# Patient Record
Sex: FEMALE | Race: WHITE | NOT HISPANIC OR LATINO | Employment: OTHER | URBAN - METROPOLITAN AREA
[De-identification: names, ages, dates, MRNs, and addresses within clinical notes are randomized per-mention and may not be internally consistent; named-entity substitution may affect disease eponyms.]

---

## 2019-11-16 ENCOUNTER — OFFICE VISIT (OUTPATIENT)
Dept: URGENT CARE | Facility: CLINIC | Age: 54
End: 2019-11-16
Payer: COMMERCIAL

## 2019-11-16 VITALS
HEART RATE: 68 BPM | HEIGHT: 65 IN | SYSTOLIC BLOOD PRESSURE: 126 MMHG | DIASTOLIC BLOOD PRESSURE: 78 MMHG | WEIGHT: 185 LBS | RESPIRATION RATE: 18 BRPM | TEMPERATURE: 98.7 F | BODY MASS INDEX: 30.82 KG/M2 | OXYGEN SATURATION: 98 %

## 2019-11-16 DIAGNOSIS — R53.83 FATIGUE, UNSPECIFIED TYPE: ICD-10-CM

## 2019-11-16 DIAGNOSIS — M19.90 GENERALIZED ARTHRITIS: Primary | ICD-10-CM

## 2019-11-16 PROCEDURE — G0382 LEV 3 HOSP TYPE B ED VISIT: HCPCS | Performed by: PHYSICIAN ASSISTANT

## 2019-11-16 PROCEDURE — S9083 URGENT CARE CENTER GLOBAL: HCPCS | Performed by: PHYSICIAN ASSISTANT

## 2019-11-16 RX ORDER — LORAZEPAM 1 MG/1
1 TABLET ORAL DAILY PRN
Refills: 5 | COMMUNITY
Start: 2019-10-18 | End: 2020-07-14 | Stop reason: SDUPTHER

## 2019-11-16 RX ORDER — LEVOTHYROXINE SODIUM 0.07 MG/1
75 TABLET ORAL DAILY
Refills: 0 | COMMUNITY
Start: 2019-08-31 | End: 2020-08-14 | Stop reason: SDUPTHER

## 2019-11-16 RX ORDER — DOXYCYCLINE 100 MG/1
100 TABLET ORAL 2 TIMES DAILY
Qty: 28 TABLET | Refills: 0 | Status: SHIPPED | OUTPATIENT
Start: 2019-11-16 | End: 2019-11-30

## 2019-11-16 NOTE — PATIENT INSTRUCTIONS
Concern for Lyme disease  Prescription sent to the pharmacy for doxycycline  Follow-up with your primary care physician as soon as possible for further evaluation and recommendations  We are unable to perform Lyme titers in urgent care  May continue anti-inflammatories for pain  Proceed to  ER if symptoms worsen  Lyme Disease   AMBULATORY CARE:   Lyme disease  is a bacterial infection caused by the bite of an infected tick  Common symptoms include the following:   · A red rash that looks like a target or bull's eye    · Fever, chills, or sore throat    · Weakness and tiredness    · Headache or muscle aches    · Joint pain    · Abdominal pain, nausea, or diarrhea  Call 911 for any of the following:   · Your heart is beating faster than usual and you feel dizzy  · You have chest pain or trouble breathing  · You suddenly cannot talk or see well, or you have trouble moving an area of your body  Seek care immediately if:   · You have a headache and a stiff neck  · You have trouble concentrating or thinking clearly  · You have numbness or tingling in your arms or legs, or you have trouble walking  Contact your healthcare provider if:   · Your rash grows or spreads to other areas of your body  · You suddenly have trouble falling or staying asleep  · You have new or worsening pain and swelling in your joints  · You have new or worsening weakness and muscle pain  · You have a new tick bite  · You have questions or concerns about your condition or care  Treatment for Lyme disease  may include any of the following:  · Antibiotics  treat a bacterial infection  · NSAIDs , such as ibuprofen, help decrease swelling, pain, and fever  This medicine is available with or without a doctor's order  NSAIDs can cause stomach bleeding or kidney problems in certain people  If you take blood thinner medicine, always ask your healthcare provider if NSAIDs are safe for you   Always read the medicine label and follow directions  Follow up with your healthcare provider as directed:  Write down your questions so you remember to ask them during your visits  Prevent a tick bite:  Ticks live in areas covered by brush and grass  They may even be found in your lawn if you live in certain areas  Outdoor pets can carry ticks inside the house  Ticks can grab onto you or your clothes when you walk by grass or brush  If you go into areas that contain many trees, tall grasses, and underbrush, do the following:  · Wear light colored pants and a long-sleeved shirt  Tuck your pants into your socks or boots  Tuck in your shirt  Wear sleeves that fit close to the skin at your wrists and neck  This will help prevent ticks from crawling through gaps in your clothing and onto your skin  Wear a hat in areas with trees  · Apply insect repellant on your skin  The insect repellant should contain DEET  Do not put insect repellant on skin that is cut, scratched, or irritated  Always use soap and water to wash the insect repellant off as soon as possible once you are indoors  Do not apply insect repellant on your child's face or hands  · Spray insect repellant onto your clothes  Use permethrin spray  This spray kills ticks that crawl on your clothing  Be sure to spray the tops of your boots, bottom of pant legs, and sleeve cuffs  As soon as possible, wash and dry clothing in hot water and high heat  · Check your and your child's clothing, hair, and skin for ticks  Shower within 2 hours of coming indoors  Carefully check the hairline, armpits, neck, and waist      · Decrease the risk for ticks in your yard  Ticks like to live in shady, moist areas  [place] End[employer] your lawn regularly to keep the grass short  Trim the grass around birdbaths and fences  Cut branches that are overgrown and take them out of the yard  Clear out leaf piles  [place] Other firewood in a dry, sarwat area  · Treat pets with tick control products  as directed   This will decrease your risk for a tick bite  Check your pets for ticks  Remove ticks from pets the same way as you remove them from people  Ask your pet's  about the best product to use on your pet  · Remove a tick with tweezers  Wear gloves  Grasp the tick as close to your skin as possible  Pull the tick straight up and out  Do not touch the tick with your bare hands  Check to make sure you removed the whole tick, including the head  Clean the area with soap and water or rubbing alcohol  Then wash your hands with soap and water  For more information:   · Centers for Disease Control and Prevention ([facility])  [address] , [address]  Phone: 3- 562 - 688-4052  Web Address: Eileen arana  © 2017 [facility] Information is for End User's use only and may not be sold, redistributed or otherwise used for commercial purposes  All illustrations and images included in CareNotes® are the copyrighted property of A D A M , Inc  or [employer]  The above information is an  only  It is not intended as medical advice for individual conditions or treatments  Talk to your doctor, nurse or pharmacist before following any medical regimen to see if it is safe and effective for you

## 2019-11-16 NOTE — PROGRESS NOTES
[facility]        NAME: Akanksha Kowalski is a 49 y o  female  : 1965    MRN: [id number]  DATE: December 10, 2019  TIME: 1:54 PM    Assessment and Plan   Generalized arthritis [M19 90]  1  Generalized arthritis  doxycycline (ADOXA) 100 MG tablet   2  Fatigue, unspecified type           Patient Instructions   Concern for Lyme disease  Prescription sent to the pharmacy for doxycycline  Follow-up with your primary care physician as soon as possible for further evaluation and recommendations  We are unable to perform Lyme titers in urgent care  May continue anti-inflammatories for pain  Proceed to  ER if symptoms worsen  Chief Complaint     Chief Complaint   Patient presents with    Knee Pain     c/o of bilateral knee pain for the last 2 weeks and yesterday her bilateral elbow started and bilateral fingers - concerned about the progression of s/s         History of Present Illness   The patient is a 67-year-old female who presents with generalized symptoms that have been present for at least 2 weeks  She states that she initially developed bilateral knee pain that has since progressed to aching joints in her hands, elbows, shoulders, et [place] To  She denies any swelling or redness of her joints  Negative fever or shaking chills  Positive headache  Positive weakness  No visible rash  She denies a history of Lyme disease or recent tick removal   She states, however, that ticks are commonly found where she lives and her  has had Lyme disease  She denies any focal deficits  Negative memory changes  Negative syncope  Negative chest pain or shortness of breath  Negative abdominal pain or diarrhea  Positive nausea without vomiting  HPI    Review of Systems   Review of Systems   Constitutional: Positive for fatigue  Negative for activity change, chills, fever and unexpected weight change     HENT: Negative for congestion, ear discharge, ear pain, facial swelling, mouth sores, postnasal drip, rhinorrhea, sinus pressure, sinus pain, sneezing, sore throat and trouble swallowing  Eyes: Negative for pain, discharge, redness and itching  Respiratory: Negative for apnea, cough, chest tightness, shortness of breath, wheezing and stridor  Cardiovascular: Negative for chest pain, palpitations and leg swelling  Gastrointestinal: Positive for nausea  Negative for abdominal distention, abdominal pain, diarrhea and vomiting  Genitourinary: Negative for difficulty urinating  Musculoskeletal: Positive for arthralgias  Negative for back pain, gait problem, joint swelling, myalgias, neck pain and neck stiffness  Skin: Negative for color change, pallor, rash and wound  Allergic/Immunologic: Negative  Negative for immunocompromised state  Neurological: Negative for dizziness, tremors, seizures, syncope, facial asymmetry, speech difficulty, weakness, light-headedness, numbness and headaches  Hematological: Negative  Negative for adenopathy  Does not bruise/bleed easily  Psychiatric/Behavioral: Negative  All other systems reviewed and are negative  Current Medications       Current Outpatient Medications:     levothyroxine 75 mcg tablet, Take 75 mcg by mouth daily, Disp: , Rfl: 0    LORazepam (ATIVAN) 1 mg tablet, Take 1 mg by mouth daily as needed, Disp: , Rfl: 5    Current Allergies     Allergies as of 11/16/2019 - Reviewed 11/16/2019   Allergen Reaction Noted    Aleve [naproxen]  11/16/2019    Penicillins  11/16/2019            The following portions of the patient's history were reviewed and updated as appropriate: allergies, current medications, past family history, past medical history, past social history, past surgical history and problem list      No past medical history on file  No past surgical history on file  No family history on file  Medications have been verified          Objective   /78 (BP Location: Right arm, Patient Position: Sitting, Cuff Size: Standard)   Pulse 68   Temp 98 7 °F (37 1 °C) (Temporal)   Resp 18   Ht 5' 5" (1 651 m)   Wt 83 9 kg (185 lb)   SpO2 98%   BMI 30 79 kg/m²        Physical Exam     Physical Exam   Constitutional: She is oriented to person, place, and time  She appears well-developed and well-nourished  No distress  HENT:   Head: Normocephalic and atraumatic  Right Ear: Hearing, tympanic membrane, external ear and ear canal normal  No drainage or tenderness  Tympanic membrane is not perforated, not erythematous, not retracted and not bulging  No middle ear effusion  No decreased hearing is noted  Left Ear: Hearing, tympanic membrane, external ear and ear canal normal  No drainage or tenderness  Tympanic membrane is not perforated, not erythematous, not retracted and not bulging  No middle ear effusion  No decreased hearing is noted  Nose: Nose normal  No mucosal edema, rhinorrhea or septal deviation  Right sinus exhibits no maxillary sinus tenderness and no frontal sinus tenderness  Left sinus exhibits no maxillary sinus tenderness and no frontal sinus tenderness  Mouth/Throat: Uvula is midline, oropharynx is clear and moist and mucous membranes are normal  No oral lesions  No dental abscesses or uvula swelling  No oropharyngeal exudate, posterior oropharyngeal edema, posterior oropharyngeal erythema or tonsillar abscesses  Eyes: Pupils are equal, round, and reactive to light  Conjunctivae and EOM are normal    Neck: Trachea normal, normal range of motion and full passive range of motion without pain  Neck supple  No JVD present  No edema and no erythema present  No thyromegaly present  Cardiovascular: Normal rate, regular rhythm, S1 normal, S2 normal, normal heart sounds, intact distal pulses and normal pulses  No murmur heard  Pulmonary/Chest: Effort normal and breath sounds normal  No accessory muscle usage or stridor  No respiratory distress  She has no wheezes  Abdominal: Soft   Normal appearance and bowel sounds are normal  She exhibits no distension  There is no hepatosplenomegaly  There is no tenderness  Musculoskeletal: Normal range of motion  She exhibits no edema  Generalized arthralgias  Negative joint edema, erythema or effusion  Negative wounds or abrasions  Lymphadenopathy:        Head (right side): No submental, no submandibular, no tonsillar, no preauricular, no posterior auricular and no occipital adenopathy present  Head (left side): No submental, no submandibular, no tonsillar, no preauricular, no posterior auricular and no occipital adenopathy present  She has no cervical adenopathy  Neurological: She is alert and oriented to person, place, and time  Skin: Skin is warm, dry and intact  No abrasion, no lesion and no rash noted  She is not diaphoretic  No cyanosis or erythema  Nails show no clubbing  Psychiatric: She has a normal mood and affect  Her speech is normal and behavior is normal    Nursing note and vitals reviewed

## 2020-07-14 DIAGNOSIS — F41.9 ANXIETY: Primary | ICD-10-CM

## 2020-07-14 RX ORDER — LORAZEPAM 1 MG/1
1 TABLET ORAL DAILY
Qty: 30 TABLET | Refills: 0 | Status: SHIPPED | OUTPATIENT
Start: 2020-07-14 | End: 2020-08-14 | Stop reason: SDUPTHER

## 2020-07-14 NOTE — TELEPHONE ENCOUNTER
Patient requesting refill on lorazepam 1 mg #30 to [facility] pharmacy   OV scheduled for 8/14/2020

## 2020-08-14 ENCOUNTER — OFFICE VISIT (OUTPATIENT)
Dept: FAMILY MEDICINE CLINIC | Facility: CLINIC | Age: 55
End: 2020-08-14
Payer: COMMERCIAL

## 2020-08-14 VITALS
HEIGHT: 65 IN | SYSTOLIC BLOOD PRESSURE: 102 MMHG | OXYGEN SATURATION: 98 % | WEIGHT: 192 LBS | TEMPERATURE: 97.2 F | DIASTOLIC BLOOD PRESSURE: 62 MMHG | BODY MASS INDEX: 31.99 KG/M2 | RESPIRATION RATE: 18 BRPM | HEART RATE: 68 BPM

## 2020-08-14 DIAGNOSIS — R53.83 OTHER FATIGUE: ICD-10-CM

## 2020-08-14 DIAGNOSIS — E03.9 HYPOTHYROIDISM, UNSPECIFIED TYPE: ICD-10-CM

## 2020-08-14 DIAGNOSIS — F41.9 ANXIETY: ICD-10-CM

## 2020-08-14 DIAGNOSIS — S61.219A LACERATION OF FINGER WITHOUT COMPLICATION, INITIAL ENCOUNTER: ICD-10-CM

## 2020-08-14 DIAGNOSIS — Z13.220 SCREENING CHOLESTEROL LEVEL: ICD-10-CM

## 2020-08-14 DIAGNOSIS — L71.9 ROSACEA: Primary | ICD-10-CM

## 2020-08-14 PROCEDURE — 3008F BODY MASS INDEX DOCD: CPT | Performed by: FAMILY MEDICINE

## 2020-08-14 PROCEDURE — 1036F TOBACCO NON-USER: CPT | Performed by: FAMILY MEDICINE

## 2020-08-14 PROCEDURE — 90715 TDAP VACCINE 7 YRS/> IM: CPT | Performed by: FAMILY MEDICINE

## 2020-08-14 PROCEDURE — 90471 IMMUNIZATION ADMIN: CPT | Performed by: FAMILY MEDICINE

## 2020-08-14 PROCEDURE — 99213 OFFICE O/P EST LOW 20 MIN: CPT | Performed by: FAMILY MEDICINE

## 2020-08-14 RX ORDER — LEVOTHYROXINE SODIUM 0.07 MG/1
75 TABLET ORAL DAILY
Qty: 90 TABLET | Refills: 1 | Status: SHIPPED | OUTPATIENT
Start: 2020-08-14 | End: 2020-09-15

## 2020-08-14 RX ORDER — METRONIDAZOLE 10 MG/G
GEL TOPICAL DAILY
Qty: 45 G | Refills: 2 | Status: SHIPPED | OUTPATIENT
Start: 2020-08-14

## 2020-08-14 RX ORDER — LORAZEPAM 1 MG/1
1 TABLET ORAL DAILY
Qty: 30 TABLET | Refills: 0 | Status: SHIPPED | OUTPATIENT
Start: 2020-08-14 | End: 2020-09-15

## 2020-08-14 RX ORDER — METRONIDAZOLE 10 MG/G
GEL TOPICAL DAILY
COMMUNITY
End: 2020-08-14 | Stop reason: SDUPTHER

## 2020-08-14 NOTE — PROGRESS NOTES
BMI Counseling: Body mass index is 31 95 kg/m²  The BMI is above normal  Nutrition recommendations include decreasing portion sizes, encouraging healthy choices of fruits and vegetables, consuming healthier snacks, limiting drinks that contain sugar, moderation in carbohydrate intake, increasing intake of lean protein, reducing intake of saturated and trans fat and reducing intake of cholesterol  No pharmacotherapy was ordered  Assessment/Plan:         Problem List Items Addressed This Visit     None            Subjective:      Patient ID: Meek Roe is a 49 y o  female  Pt here for checkup on hypothyroidism, rosacea, anxiety and recently had fallen an dhurt R knee and cut self with scissors  Pt with a 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  Surface of middle  Finger  Pt with  TD shot >10 years  The following portions of the patient's history were reviewed and updated as appropriate:   She has no past medical history on file  ,  does not have a problem list on file  ,   has no past surgical history on file ,  Family history is unknown by patient  ,   reports that she has never smoked  She has never used smokeless tobacco  She reports current alcohol use  No history on file for drug ,  is allergic to aleve [naproxen] and penicillins     Current Outpatient Medications   Medication Sig Dispense Refill    levothyroxine 75 mcg tablet Take 75 mcg by mouth daily  0    LORazepam (ATIVAN) 1 mg tablet Take 1 tablet (1 mg total) by mouth daily 30 tablet 0    metroNIDAZOLE (METROGEL) 1 % gel Apply topically daily       No current facility-administered medications for this visit  Review of Systems   Constitutional: Negative for activity change, appetite change, fatigue and fever  HENT: Negative for congestion, ear pain, postnasal drip, rhinorrhea, sinus pressure, sinus pain, sneezing and sore throat  Eyes: Negative for pain and redness     Respiratory: Negative for apnea, cough, chest tightness, shortness of breath and wheezing  Cardiovascular: Negative for chest pain, palpitations and leg swelling  Gastrointestinal: Negative for abdominal pain, constipation, diarrhea, nausea and vomiting  Endocrine: Negative for cold intolerance and heat intolerance  Genitourinary: Negative for difficulty urinating, dysuria, frequency, hematuria and urgency  Musculoskeletal: Negative for arthralgias, back pain, gait problem and myalgias  Skin: Negative for rash  Neurological: Negative for dizziness, speech difficulty, weakness, numbness and headaches  Hematological: Does not bruise/bleed easily  Psychiatric/Behavioral: Negative for agitation, confusion and hallucinations  Objective:  Vitals:    08/14/20 1230   BP: 102/62   BP Location: Left arm   Patient Position: Sitting   Cuff Size: Large   Pulse: 68   Resp: 18   Temp: (!) 97 2 °F (36 2 °C)   TempSrc: Tympanic   SpO2: 98%   Weight: 87 1 kg (192 lb)   Height: 5' 5" (1 651 m)     Body mass index is 31 95 kg/m²  Physical Exam  Vitals signs and nursing note reviewed  Constitutional:       Appearance: She is well-developed  HENT:      Head: Normocephalic and atraumatic  Nose: Nose normal    Eyes:      General: No scleral icterus  Conjunctiva/sclera: Conjunctivae normal       Pupils: Pupils are equal, round, and reactive to light  Neck:      Musculoskeletal: Normal range of motion and neck supple  Thyroid: No thyromegaly  Pulmonary:      Effort: Pulmonary effort is normal       Breath sounds: Normal breath sounds  No wheezing  Abdominal:      General: Bowel sounds are normal  There is no distension  Palpations: Abdomen is soft  Tenderness: There is no abdominal tenderness  There is no guarding or rebound  Musculoskeletal: Normal range of motion  General: No tenderness or deformity  Skin:     General: Skin is warm and dry  Findings: Signs of injury present  No erythema or rash        Comments: Laceration on middle  Phalanx not  Needing  Sutures  Neurological:      Mental Status: She is alert and oriented to person, place, and time  Sensory: No sensory deficit  Psychiatric:         Behavior: Behavior normal          Thought Content:  Thought content normal          Judgment: Judgment normal

## 2020-09-13 DIAGNOSIS — E03.9 HYPOTHYROIDISM, UNSPECIFIED TYPE: ICD-10-CM

## 2020-09-13 DIAGNOSIS — F41.9 ANXIETY: ICD-10-CM

## 2020-09-15 RX ORDER — LEVOTHYROXINE SODIUM 0.07 MG/1
TABLET ORAL
Qty: 30 TABLET | Refills: 5 | Status: SHIPPED | OUTPATIENT
Start: 2020-09-15 | End: 2021-03-21

## 2020-09-15 RX ORDER — LORAZEPAM 1 MG/1
TABLET ORAL
Qty: 30 TABLET | Refills: 0 | Status: SHIPPED | OUTPATIENT
Start: 2020-09-15 | End: 2020-10-08

## 2020-10-08 DIAGNOSIS — F41.9 ANXIETY: ICD-10-CM

## 2020-10-08 RX ORDER — LORAZEPAM 1 MG/1
TABLET ORAL
Qty: 30 TABLET | Refills: 0 | Status: SHIPPED | OUTPATIENT
Start: 2020-10-08 | End: 2020-11-08

## 2020-11-07 LAB
ALBUMIN SERPL-MCNC: 4 G/DL (ref 3.6–5.1)
ALBUMIN/GLOB SERPL: 1.6 (CALC) (ref 1–2.5)
ALP SERPL-CCNC: 46 U/L (ref 37–153)
ALT SERPL-CCNC: 11 U/L (ref 6–29)
APPEARANCE UR: CLEAR
AST SERPL-CCNC: 14 U/L (ref 10–35)
BACTERIA UR QL AUTO: NORMAL /HPF
BASOPHILS # BLD AUTO: 69 CELLS/UL (ref 0–200)
BASOPHILS NFR BLD AUTO: 1.5 %
BILIRUB SERPL-MCNC: 0.9 MG/DL (ref 0.2–1.2)
BILIRUB UR QL STRIP: NEGATIVE
BUN SERPL-MCNC: 16 MG/DL (ref 7–25)
BUN/CREAT SERPL: NORMAL (CALC) (ref 6–22)
CALCIUM SERPL-MCNC: 9.3 MG/DL (ref 8.6–10.4)
CHLORIDE SERPL-SCNC: 106 MMOL/L (ref 98–110)
CHOLEST SERPL-MCNC: 155 MG/DL
CHOLEST/HDLC SERPL: 4.2 (CALC)
CO2 SERPL-SCNC: 25 MMOL/L (ref 20–32)
COLOR UR: YELLOW
CREAT SERPL-MCNC: 0.78 MG/DL (ref 0.5–1.05)
EOSINOPHIL # BLD AUTO: 340 CELLS/UL (ref 15–500)
EOSINOPHIL NFR BLD AUTO: 7.4 %
ERYTHROCYTE [DISTWIDTH] IN BLOOD BY AUTOMATED COUNT: 11.9 % (ref 11–15)
GLOBULIN SER CALC-MCNC: 2.5 G/DL (CALC) (ref 1.9–3.7)
GLUCOSE SERPL-MCNC: 93 MG/DL (ref 65–99)
GLUCOSE UR QL STRIP: NEGATIVE
HCT VFR BLD AUTO: 43.9 % (ref 35–45)
HDLC SERPL-MCNC: 37 MG/DL
HGB BLD-MCNC: 14 G/DL (ref 11.7–15.5)
HGB UR QL STRIP: NEGATIVE
HYALINE CASTS #/AREA URNS LPF: NORMAL /LPF
KETONES UR QL STRIP: NEGATIVE
LDLC SERPL CALC-MCNC: 101 MG/DL (CALC)
LEUKOCYTE ESTERASE UR QL STRIP: NEGATIVE
LYMPHOCYTES # BLD AUTO: 1559 CELLS/UL (ref 850–3900)
LYMPHOCYTES NFR BLD AUTO: 33.9 %
MAGNESIUM SERPL-MCNC: 2.1 MG/DL (ref 1.5–2.5)
MCH RBC QN AUTO: 29.9 PG (ref 27–33)
MCHC RBC AUTO-ENTMCNC: 31.9 G/DL (ref 32–36)
MCV RBC AUTO: 93.8 FL (ref 80–100)
MONOCYTES # BLD AUTO: 331 CELLS/UL (ref 200–950)
MONOCYTES NFR BLD AUTO: 7.2 %
NEUTROPHILS # BLD AUTO: 2300 CELLS/UL (ref 1500–7800)
NEUTROPHILS NFR BLD AUTO: 50 %
NITRITE UR QL STRIP: NEGATIVE
NONHDLC SERPL-MCNC: 118 MG/DL (CALC)
PH UR STRIP: 8 [PH] (ref 5–8)
PLATELET # BLD AUTO: 234 THOUSAND/UL (ref 140–400)
PMV BLD REES-ECKER: 10.1 FL (ref 7.5–12.5)
POTASSIUM SERPL-SCNC: 4.4 MMOL/L (ref 3.5–5.3)
PROT SERPL-MCNC: 6.5 G/DL (ref 6.1–8.1)
PROT UR QL STRIP: NEGATIVE
RBC # BLD AUTO: 4.68 MILLION/UL (ref 3.8–5.1)
RBC #/AREA URNS HPF: NORMAL /HPF
SL AMB EGFR AFRICAN AMERICAN: 100 ML/MIN/1.73M2
SL AMB EGFR NON AFRICAN AMERICAN: 86 ML/MIN/1.73M2
SODIUM SERPL-SCNC: 139 MMOL/L (ref 135–146)
SP GR UR STRIP: 1.02 (ref 1–1.03)
SQUAMOUS #/AREA URNS HPF: NORMAL /HPF
T4 FREE SERPL-MCNC: 1.2 NG/DL (ref 0.8–1.8)
TRIGL SERPL-MCNC: 81 MG/DL
TSH SERPL-ACNC: 1.5 MIU/L
URATE SERPL-MCNC: 6.3 MG/DL (ref 2.5–7)
WBC # BLD AUTO: 4.6 THOUSAND/UL (ref 3.8–10.8)
WBC #/AREA URNS HPF: NORMAL /HPF

## 2020-11-08 DIAGNOSIS — F41.9 ANXIETY: ICD-10-CM

## 2020-11-08 RX ORDER — LORAZEPAM 1 MG/1
TABLET ORAL
Qty: 30 TABLET | Refills: 0 | Status: SHIPPED | OUTPATIENT
Start: 2020-11-08 | End: 2020-12-14

## 2020-11-10 ENCOUNTER — TELEMEDICINE (OUTPATIENT)
Dept: FAMILY MEDICINE CLINIC | Facility: CLINIC | Age: 55
End: 2020-11-10
Payer: COMMERCIAL

## 2020-11-10 DIAGNOSIS — G89.29 CHRONIC PAIN OF BOTH KNEES: ICD-10-CM

## 2020-11-10 DIAGNOSIS — G89.29 CHRONIC PAIN OF MULTIPLE JOINTS: Primary | ICD-10-CM

## 2020-11-10 DIAGNOSIS — M25.50 CHRONIC PAIN OF MULTIPLE JOINTS: Primary | ICD-10-CM

## 2020-11-10 DIAGNOSIS — M25.561 CHRONIC PAIN OF BOTH KNEES: ICD-10-CM

## 2020-11-10 DIAGNOSIS — M25.562 CHRONIC PAIN OF BOTH KNEES: ICD-10-CM

## 2020-11-10 PROCEDURE — G2012 BRIEF CHECK IN BY MD/QHP: HCPCS | Performed by: NURSE PRACTITIONER

## 2020-11-20 LAB
B BURGDOR AB SER IA-ACNC: <0.9 INDEX
RHEUMATOID FACT SERPL-ACNC: <14 IU/ML

## 2020-11-27 ENCOUNTER — TELEPHONE (OUTPATIENT)
Dept: FAMILY MEDICINE CLINIC | Facility: CLINIC | Age: 55
End: 2020-11-27

## 2020-12-13 DIAGNOSIS — F41.9 ANXIETY: ICD-10-CM

## 2020-12-14 RX ORDER — LORAZEPAM 1 MG/1
TABLET ORAL
Qty: 30 TABLET | Refills: 0 | Status: SHIPPED | OUTPATIENT
Start: 2020-12-14 | End: 2021-01-12

## 2021-01-10 DIAGNOSIS — F41.9 ANXIETY: ICD-10-CM

## 2021-01-12 RX ORDER — LORAZEPAM 1 MG/1
TABLET ORAL
Qty: 30 TABLET | Refills: 0 | Status: SHIPPED | OUTPATIENT
Start: 2021-01-12 | End: 2021-02-05

## 2021-02-04 DIAGNOSIS — F41.9 ANXIETY: ICD-10-CM

## 2021-02-05 ENCOUNTER — OFFICE VISIT (OUTPATIENT)
Dept: FAMILY MEDICINE CLINIC | Facility: CLINIC | Age: 56
End: 2021-02-05
Payer: COMMERCIAL

## 2021-02-05 VITALS
SYSTOLIC BLOOD PRESSURE: 126 MMHG | TEMPERATURE: 98.3 F | BODY MASS INDEX: 32.82 KG/M2 | RESPIRATION RATE: 18 BRPM | OXYGEN SATURATION: 99 % | WEIGHT: 197 LBS | HEIGHT: 65 IN | DIASTOLIC BLOOD PRESSURE: 74 MMHG | HEART RATE: 70 BPM

## 2021-02-05 DIAGNOSIS — M25.50 MULTIPLE JOINT PAIN: Primary | ICD-10-CM

## 2021-02-05 DIAGNOSIS — Z12.12 SCREENING FOR COLORECTAL CANCER: ICD-10-CM

## 2021-02-05 DIAGNOSIS — Z12.31 SCREENING MAMMOGRAM FOR HIGH-RISK PATIENT: ICD-10-CM

## 2021-02-05 DIAGNOSIS — Z12.11 SCREENING FOR COLORECTAL CANCER: ICD-10-CM

## 2021-02-05 DIAGNOSIS — Z11.4 SCREENING FOR HIV (HUMAN IMMUNODEFICIENCY VIRUS): ICD-10-CM

## 2021-02-05 DIAGNOSIS — E03.9 HYPOTHYROIDISM, UNSPECIFIED TYPE: ICD-10-CM

## 2021-02-05 DIAGNOSIS — F41.9 ANXIETY: ICD-10-CM

## 2021-02-05 DIAGNOSIS — Z12.4 SCREENING FOR CERVICAL CANCER: ICD-10-CM

## 2021-02-05 PROCEDURE — 3725F SCREEN DEPRESSION PERFORMED: CPT | Performed by: FAMILY MEDICINE

## 2021-02-05 PROCEDURE — 99396 PREV VISIT EST AGE 40-64: CPT | Performed by: FAMILY MEDICINE

## 2021-02-05 RX ORDER — LORAZEPAM 1 MG/1
TABLET ORAL
Qty: 30 TABLET | Refills: 0 | Status: SHIPPED | OUTPATIENT
Start: 2021-02-05 | End: 2021-03-13

## 2021-02-05 NOTE — PROGRESS NOTES
Assessment/Plan:         Problem List Items Addressed This Visit     None      Visit Diagnoses     Screening for HIV (human immunodeficiency virus)        Screening for cervical cancer        Screening for colorectal cancer        Anxiety                Subjective:      Patient ID: Constance Wei is a 48 y o  female  Patient today for physical as well as follow-up on hypothyroidism and anxiety  Patient had a refill on her Ativan yesterday  Patient also with a new problem with multiple joint pain and will order room profile to further evaluate that problem  Patient also had been recommended to see a gynecologist for she needs screening  Patient recently moved right lower quadrant pain that have resolved  Patient is overdue for Pap smear and gyn screening  Also ordered a mammogram for her  Patient also had ultrasound of her thyroid doctoral any kind of thyroid masses or nodules  Patient also be getting lab work for her rheumatoid out rheumatological profile done  The following portions of the patient's history were reviewed and updated as appropriate:   Past Medical History:  She has a past medical history of Disease of thyroid gland  ,  _______________________________________________________________________  Medical Problems:  does not have any pertinent problems on file ,  _______________________________________________________________________  Past Surgical History:   has no past surgical history on file ,  _______________________________________________________________________  Family History:  Family history is unknown by patient  ,  _______________________________________________________________________  Social History:   reports that she has never smoked  She has never used smokeless tobacco  She reports current alcohol use  She reports that she does not use drugs  ,  _______________________________________________________________________  Allergies:  is allergic to aleve [naproxen] and penicillins     _______________________________________________________________________  Current Outpatient Medications   Medication Sig Dispense Refill    levothyroxine 75 mcg tablet TAKE ONE TABLET BY MOUTH EVERY DAY 30 tablet 5    LORazepam (ATIVAN) 1 mg tablet TAKE ONE TABLET BY MOUTH EVERY DAY 30 tablet 0    metroNIDAZOLE (METROGEL) 1 % gel Apply topically daily 45 g 2     No current facility-administered medications for this visit       _______________________________________________________________________  Review of Systems   Constitutional: Negative for activity change, appetite change, fatigue and fever  HENT: Negative for congestion, ear pain, postnasal drip, rhinorrhea, sinus pressure, sinus pain, sneezing and sore throat  Eyes: Negative for pain and redness  Respiratory: Negative for apnea, cough, chest tightness, shortness of breath and wheezing  Cardiovascular: Negative for chest pain, palpitations and leg swelling  Gastrointestinal: Negative for abdominal pain, constipation, diarrhea, nausea and vomiting  Endocrine: Negative for cold intolerance and heat intolerance  Genitourinary: Negative for difficulty urinating, dysuria, frequency, hematuria and urgency  Musculoskeletal: Negative for arthralgias, back pain, gait problem and myalgias  Skin: Negative for rash  Neurological: Negative for dizziness, speech difficulty, weakness, numbness and headaches  Hematological: Does not bruise/bleed easily  Psychiatric/Behavioral: Negative for agitation, confusion and hallucinations  Objective:  Vitals:    02/05/21 0952   BP: 126/74   BP Location: Left arm   Patient Position: Sitting   Cuff Size: Standard   Pulse: 70   Resp: 18   Temp: 98 3 °F (36 8 °C)   SpO2: 99%   Weight: 89 4 kg (197 lb)   Height: 5' 5" (1 651 m)     Body mass index is 32 78 kg/m²  Physical Exam  Vitals signs and nursing note reviewed  Constitutional:       Appearance: She is well-developed   She is obese    HENT:      Head: Normocephalic and atraumatic  Nose: Nose normal       Mouth/Throat:      Mouth: Mucous membranes are moist    Eyes:      General: No scleral icterus  Conjunctiva/sclera: Conjunctivae normal       Pupils: Pupils are equal, round, and reactive to light  Neck:      Musculoskeletal: Normal range of motion and neck supple  Thyroid: No thyromegaly  Cardiovascular:      Rate and Rhythm: Normal rate and regular rhythm  Pulmonary:      Effort: Pulmonary effort is normal       Breath sounds: Normal breath sounds  No wheezing  Abdominal:      General: Bowel sounds are normal  There is no distension  Palpations: Abdomen is soft  Tenderness: There is no abdominal tenderness  There is no guarding or rebound  Musculoskeletal: Normal range of motion  General: Tenderness present  No deformity  Skin:     General: Skin is warm and dry  Findings: No erythema or rash  Neurological:      Mental Status: She is alert and oriented to person, place, and time  Sensory: No sensory deficit  Psychiatric:         Behavior: Behavior normal          Thought Content:  Thought content normal          Judgment: Judgment normal

## 2021-02-09 ENCOUNTER — TELEPHONE (OUTPATIENT)
Dept: FAMILY MEDICINE CLINIC | Facility: CLINIC | Age: 56
End: 2021-02-09

## 2021-02-09 DIAGNOSIS — E03.9 HYPOTHYROIDISM, UNSPECIFIED TYPE: ICD-10-CM

## 2021-02-09 DIAGNOSIS — R10.31 RIGHT LOWER QUADRANT ABDOMINAL PAIN: Primary | ICD-10-CM

## 2021-02-09 NOTE — TELEPHONE ENCOUNTER
Pt called and stated that she was recently seen she was having lower abdominal pain like an ovarian cyst  She does have an appointment with GYN and she states that she thinks that problems with thyroid could contribute to this and wants to know if you can order bloodwork for this  She also is open for any other bloodwork that might help with this  Pt would like a call when ready so she can  script  She usually goes to [facility]

## 2021-02-18 LAB
ANA SER QL IF: NEGATIVE
BASOPHILS # BLD AUTO: 70 CELLS/UL (ref 0–200)
BASOPHILS NFR BLD AUTO: 1.4 %
CRP SERPL-MCNC: 2.4 MG/L
EOSINOPHIL # BLD AUTO: 250 CELLS/UL (ref 15–500)
EOSINOPHIL NFR BLD AUTO: 5 %
ERYTHROCYTE [DISTWIDTH] IN BLOOD BY AUTOMATED COUNT: 11.8 % (ref 11–15)
ERYTHROCYTE [SEDIMENTATION RATE] IN BLOOD BY WESTERGREN METHOD: 2 MM/H
HCT VFR BLD AUTO: 41.3 % (ref 35–45)
HCYS SERPL-SCNC: 13 UMOL/L
HGB BLD-MCNC: 13.4 G/DL (ref 11.7–15.5)
HIV 1+2 AB+HIV1 P24 AG SERPL QL IA: NORMAL
LYMPHOCYTES # BLD AUTO: 1635 CELLS/UL (ref 850–3900)
LYMPHOCYTES NFR BLD AUTO: 32.7 %
MCH RBC QN AUTO: 30.5 PG (ref 27–33)
MCHC RBC AUTO-ENTMCNC: 32.4 G/DL (ref 32–36)
MCV RBC AUTO: 93.9 FL (ref 80–100)
MONOCYTES # BLD AUTO: 440 CELLS/UL (ref 200–950)
MONOCYTES NFR BLD AUTO: 8.8 %
NEUTROPHILS # BLD AUTO: 2605 CELLS/UL (ref 1500–7800)
NEUTROPHILS NFR BLD AUTO: 52.1 %
PLATELET # BLD AUTO: 250 THOUSAND/UL (ref 140–400)
PMV BLD REES-ECKER: 10.3 FL (ref 7.5–12.5)
RBC # BLD AUTO: 4.4 MILLION/UL (ref 3.8–5.1)
RHEUMATOID FACT SERPL-ACNC: <14 IU/ML
TSH SERPL-ACNC: 2 MIU/L
WBC # BLD AUTO: 5 THOUSAND/UL (ref 3.8–10.8)

## 2021-02-22 ENCOUNTER — TELEPHONE (OUTPATIENT)
Dept: FAMILY MEDICINE CLINIC | Facility: CLINIC | Age: 56
End: 2021-02-22

## 2021-02-22 NOTE — TELEPHONE ENCOUNTER
Yes it  probably does  mean she has  arthritis and we can either  address at an OV or a virtual as to what can be done

## 2021-02-22 NOTE — TELEPHONE ENCOUNTER
Patient is wondering if the inflammation shown in her blood work could mean she has arthritis? She says she's been having pain in her knee and hip  Does she need an office visit? And if so can it be a telemedicine visit?

## 2021-02-26 ENCOUNTER — TELEPHONE (OUTPATIENT)
Dept: FAMILY MEDICINE CLINIC | Facility: CLINIC | Age: 56
End: 2021-02-26

## 2021-02-26 ENCOUNTER — ANNUAL EXAM (OUTPATIENT)
Dept: OBGYN CLINIC | Facility: CLINIC | Age: 56
End: 2021-02-26
Payer: COMMERCIAL

## 2021-02-26 VITALS
WEIGHT: 196 LBS | BODY MASS INDEX: 32.65 KG/M2 | HEIGHT: 65 IN | SYSTOLIC BLOOD PRESSURE: 122 MMHG | DIASTOLIC BLOOD PRESSURE: 76 MMHG

## 2021-02-26 DIAGNOSIS — Z01.419 WOMEN'S ANNUAL ROUTINE GYNECOLOGICAL EXAMINATION: Primary | ICD-10-CM

## 2021-02-26 DIAGNOSIS — N94.10 FEMALE DYSPAREUNIA: ICD-10-CM

## 2021-02-26 DIAGNOSIS — Z12.31 ENCOUNTER FOR SCREENING MAMMOGRAM FOR BREAST CANCER: ICD-10-CM

## 2021-02-26 PROCEDURE — G0145 SCR C/V CYTO,THINLAYER,RESCR: HCPCS | Performed by: NURSE PRACTITIONER

## 2021-02-26 PROCEDURE — 0503F POSTPARTUM CARE VISIT: CPT | Performed by: NURSE PRACTITIONER

## 2021-02-26 PROCEDURE — 99386 PREV VISIT NEW AGE 40-64: CPT | Performed by: NURSE PRACTITIONER

## 2021-02-26 PROCEDURE — 87624 HPV HI-RISK TYP POOLED RSLT: CPT | Performed by: NURSE PRACTITIONER

## 2021-02-26 RX ORDER — VITAMIN E 268 MG
400 CAPSULE ORAL DAILY
COMMUNITY

## 2021-02-26 RX ORDER — NICOTINE 14MG/24HR
PATCH, TRANSDERMAL 24 HOURS TRANSDERMAL
COMMUNITY

## 2021-02-26 RX ORDER — ESTRADIOL 4 UG/1
INSERT VAGINAL
Qty: 1 EACH | Refills: 0 | Status: SHIPPED | OUTPATIENT
Start: 2021-02-26 | End: 2022-04-11

## 2021-02-26 RX ORDER — ESTRADIOL 4 UG/1
INSERT VAGINAL
Qty: 1 EACH | Refills: 10 | Status: SHIPPED | OUTPATIENT
Start: 2021-02-26 | End: 2022-04-11

## 2021-02-26 RX ORDER — PROPRANOLOL/HYDROCHLOROTHIAZID 40 MG-25MG
TABLET ORAL
COMMUNITY

## 2021-02-26 NOTE — PROGRESS NOTES
Subjective    HPI:     Sukhdeep Vences is a 48 y o  postmenopausal female  She is a  0  She complains of pain with intercourse due to dryness  Occasional hot flashes, has occasional night sweats  She denies /GI and Gyn complaints  She feels safe at home  She states her depression is improving with supplement with B complex  Medical, surgical and family history reviewed  Her dental care is up-to-date  She eats a healthy diet  She is working on loosing weight  Gynecologic History    No LMP recorded  Patient is postmenopausal     Last Pap: years ago  Had one abnormal pap smear years ago and had a colpo done which was normal   Last mammogram: was with [facility] about 2 years ago  Cologuard: 20 - negative      Obstetric History    OB History    Para Term  AB Living   0 0 0 0 0 0   SAB TAB Ectopic Multiple Live Births   0 0 0 0 0       The following portions of the patient's history were reviewed and updated as appropriate: allergies, current medications, past family history, past medical history, past social history, past surgical history and problem list     Review of Systems    Pertinent items are noted in HPI  Objective    Physical Exam  Constitutional:       Appearance: She is well-developed  Genitourinary:      Pelvic exam was performed with patient in the lithotomy position  Vulva, inguinal canal, urethra, bladder, vagina, uterus, right adnexa and left adnexa normal       No posterior fourchette tenderness, injury, rash or lesion present  Cervix is not parous  No cervical motion tenderness, discharge, friability, lesion, erythema, bleeding, polyp or nabothian cyst       Uterus is anteverted  No right or left adnexal mass present  Right adnexa not tender or full  Left adnexa not tender or full  HENT:      Head: Normocephalic and atraumatic  Neck:      Musculoskeletal: Neck supple  Thyroid: No thyromegaly     Cardiovascular: Rate and Rhythm: Normal rate and regular rhythm  Heart sounds: Normal heart sounds, S1 normal and S2 normal    Pulmonary:      Effort: Pulmonary effort is normal       Breath sounds: Normal breath sounds  Chest:      Breasts: Breasts are symmetrical          Right: Normal  No inverted nipple, mass, nipple discharge, skin change or tenderness  Left: Normal  No inverted nipple, mass, nipple discharge, skin change or tenderness  Abdominal:      General: Bowel sounds are normal  There is no distension  Palpations: Abdomen is soft  There is no mass  Tenderness: There is no abdominal tenderness  There is no guarding  Lymphadenopathy:      Cervical: No cervical adenopathy  Upper Body:      Right upper body: No supraclavicular or axillary adenopathy  Left upper body: No supraclavicular or axillary adenopathy  Neurological:      Mental Status: She is alert  Skin:     General: Skin is warm and dry  Findings: No rash  Psychiatric:         Attention and Perception: Attention and perception normal          Mood and Affect: Mood and affect normal          Speech: Speech normal          Behavior: Behavior is cooperative  Thought Content: Thought content normal          Cognition and Memory: Cognition and memory normal          Judgment: Judgment normal    Vitals signs and nursing note reviewed            Assessment and Plan    Eddie Ortega was seen today for gynecologic exam     Diagnoses and all orders for this visit:    Women's annual routine gynecological examination  -     Liquid-based pap, screening    Encounter for screening mammogram for breast cancer  -     Mammo screening bilateral w 3d & cad; Future    Female dyspareunia  -     Estradiol Starter Pack (Imvexxy Starter Pack) 4 MCG INST; 1 vaginal insert daily for 2 weeks followed by 1 insert twice a week (ex: Monday and Thursday)  -     Estradiol (Imvexxy Maintenance Pack) 4 MCG INST; 1 vaginal insert twice weekly (Monday and Thursday)        Patient informed of a Stable GYN exam  A pap smear was performed  I have discussed the importance of exercise and healthy diet as well as adequate intake of calcium and vitamin D  The current ASCCP guidelines were reviewed  The low risk patient will receive pap smear screening every 3 years until the age of 34 and then every 3 to 5 years with HPV co-testing from the ages of 33-67  I emphasized the importance of an annual pelvic and breast exam  A yearly mammogram is due  All questions have been answered to her satisfaction  Hormone replacement therapy: hormone replacement therapy: initiated with Imvexxy, risks and benefits reviewed and follow up appointment recommended  Follow up in: 2 months

## 2021-02-26 NOTE — TELEPHONE ENCOUNTER
Pt reviewed her lab work and realized she os low on B-12--she has virtual appointment on 61 51 81 but wanted to know if she can come in as a nurse visit to get a B-12 shot today? ? Please review and advise Shawna nixon [address]

## 2021-03-02 LAB
HPV HR 12 DNA CVX QL NAA+PROBE: NEGATIVE
HPV16 DNA CVX QL NAA+PROBE: NEGATIVE
HPV18 DNA CVX QL NAA+PROBE: NEGATIVE

## 2021-03-03 ENCOUNTER — TELEPHONE (OUTPATIENT)
Dept: OBGYN CLINIC | Facility: CLINIC | Age: 56
End: 2021-03-03

## 2021-03-03 DIAGNOSIS — B37.3 VAGINAL CANDIDA: Primary | ICD-10-CM

## 2021-03-03 LAB
LAB AP GYN PRIMARY INTERPRETATION: NORMAL
Lab: NORMAL
PATH INTERP SPEC-IMP: NORMAL

## 2021-03-03 RX ORDER — FLUCONAZOLE 100 MG/1
100 TABLET ORAL DAILY
Qty: 2 TABLET | Refills: 0 | Status: SHIPPED | OUTPATIENT
Start: 2021-03-03 | End: 2021-03-05

## 2021-03-04 ENCOUNTER — TELEPHONE (OUTPATIENT)
Dept: ADMINISTRATIVE | Facility: OTHER | Age: 56
End: 2021-03-04

## 2021-03-04 NOTE — TELEPHONE ENCOUNTER
----- Message from Ginny Mike sent at 3/4/2021 12:05 PM EST -----  Regarding: care gap request Cologuard  03/04/21 12:05 PM    Hello, our patient attached above has had CRC: Cologuard completed/performed  Please assist in updating the patient chart by pulling a previous Electronic Medical Record (EMR) document  The previous EMR is [facility]  The date of service is 01/24/2020      Thank you,  Nisha Copeland  [facility]

## 2021-03-05 ENCOUNTER — OFFICE VISIT (OUTPATIENT)
Dept: FAMILY MEDICINE CLINIC | Facility: CLINIC | Age: 56
End: 2021-03-05
Payer: COMMERCIAL

## 2021-03-05 VITALS
BODY MASS INDEX: 32.49 KG/M2 | HEART RATE: 61 BPM | SYSTOLIC BLOOD PRESSURE: 122 MMHG | HEIGHT: 65 IN | OXYGEN SATURATION: 99 % | WEIGHT: 195 LBS | DIASTOLIC BLOOD PRESSURE: 84 MMHG | TEMPERATURE: 97.3 F

## 2021-03-05 DIAGNOSIS — D51.0 PERNICIOUS ANEMIA: ICD-10-CM

## 2021-03-05 DIAGNOSIS — M25.562 ACUTE PAIN OF LEFT KNEE: ICD-10-CM

## 2021-03-05 DIAGNOSIS — M25.552 LEFT HIP PAIN: Primary | ICD-10-CM

## 2021-03-05 PROCEDURE — 99213 OFFICE O/P EST LOW 20 MIN: CPT | Performed by: FAMILY MEDICINE

## 2021-03-05 PROCEDURE — 1036F TOBACCO NON-USER: CPT | Performed by: FAMILY MEDICINE

## 2021-03-05 PROCEDURE — 3008F BODY MASS INDEX DOCD: CPT | Performed by: FAMILY MEDICINE

## 2021-03-05 RX ORDER — CYANOCOBALAMIN 1000 UG/ML
1000 INJECTION INTRAMUSCULAR; SUBCUTANEOUS
Status: SHIPPED | OUTPATIENT
Start: 2021-03-05

## 2021-03-05 RX ADMIN — CYANOCOBALAMIN 1000 MCG: 1000 INJECTION INTRAMUSCULAR; SUBCUTANEOUS at 16:47

## 2021-03-05 NOTE — PROGRESS NOTES
BMI Counseling: Body mass index is 32 45 kg/m²  The BMI is above normal  Nutrition recommendations include decreasing portion sizes, encouraging healthy choices of fruits and vegetables, decreasing fast food intake, consuming healthier snacks, limiting drinks that contain sugar, moderation in carbohydrate intake, increasing intake of lean protein, reducing intake of saturated and trans fat and reducing intake of cholesterol  No pharmacotherapy was ordered  Patient referred to PCP due to patient being overweight  Assessment/Plan:         Problem List Items Addressed This Visit     None      Visit Diagnoses     Left hip pain    -  Primary    Relevant Orders    XR hip/pelv 2-3 vws left if performed    Acute pain of left knee        Relevant Orders    XR knee 3 vw left non injury    Pernicious anemia        Relevant Medications    cyanocobalamin injection 1,000 mcg            Subjective:      Patient ID: Giovanna Morejon is a 48 y o  female  Pt for checkup on her labs and  Also c/o L hip and knee pain  Pt  Would like  X rays and I had ordered for her  She also was inquiring about  Getting a B12 shot  To help her with her fatigue  Also talked about COVID 19 vaccine and its efficacy and my recommendations for its use  The following portions of the patient's history were reviewed and updated as appropriate:   Past Medical History:  She has a past medical history of Abnormal Pap smear of cervix, Bilateral ovarian cysts, and Disease of thyroid gland  ,  _______________________________________________________________________  Medical Problems:  does not have any pertinent problems on file ,  _______________________________________________________________________  Past Surgical History:   has no past surgical history on file ,  _______________________________________________________________________  Family History:  family history includes Cancer in her maternal grandfather; Diabetes in her father, maternal grandmother, and sister; Heart disease in her father and maternal grandmother; Hypertension in her father and mother; Other in her sister; Thyroid disease in her mother ,  _______________________________________________________________________  Social History:   reports that she has never smoked  She has never used smokeless tobacco  She reports current alcohol use  She reports that she does not use drugs  ,  _______________________________________________________________________  Allergies:  is allergic to aleve [naproxen] and penicillins     _______________________________________________________________________  Current Outpatient Medications   Medication Sig Dispense Refill    b complex vitamins tablet Take 1 tablet by mouth daily      Cholecalciferol (VITAMIN D-1000 MAX ST PO) Take by mouth      Estradiol (Imvexxy Maintenance Pack) 4 MCG INST 1 vaginal insert twice weekly (Monday and Thursday) 1 each 10    Estradiol Starter Pack (Imvexxy Starter Pack) 4 MCG INST 1 vaginal insert daily for 2 weeks followed by 1 insert twice a week (ex: Monday and Thursday) 1 each 0    fluconazole (DIFLUCAN) 100 mg tablet Take 1 tablet (100 mg total) by mouth daily for 2 days 2 tablet 0    levothyroxine 75 mcg tablet TAKE ONE TABLET BY MOUTH EVERY DAY 30 tablet 5    Loratadine (CLARITIN PO) Take by mouth      LORazepam (ATIVAN) 1 mg tablet TAKE ONE TABLET BY MOUTH EVERY DAY 30 tablet 0    metroNIDAZOLE (METROGEL) 1 % gel Apply topically daily 45 g 2    Saccharomyces boulardii (Probiotic) 250 MG CAPS Take by mouth      Turmeric 500 MG CAPS Take by mouth      vitamin E, tocopherol, 400 units capsule Take 400 Units by mouth daily       Current Facility-Administered Medications   Medication Dose Route Frequency Provider Last Rate Last Admin    cyanocobalamin injection 1,000 mcg  1,000 mcg Intramuscular Q30 Days Emmie You MD   1,000 mcg at 03/05/21 2142 _______________________________________________________________________  Review of Systems   Constitutional: Negative for activity change, appetite change, fatigue and fever  HENT: Negative for congestion, ear pain, postnasal drip, rhinorrhea, sinus pressure, sinus pain, sneezing and sore throat  Eyes: Negative for pain and redness  Respiratory: Negative for apnea, cough, chest tightness, shortness of breath and wheezing  Cardiovascular: Negative for chest pain, palpitations and leg swelling  Gastrointestinal: Negative for abdominal pain, constipation, diarrhea, nausea and vomiting  Endocrine: Negative for cold intolerance and heat intolerance  Genitourinary: Negative for difficulty urinating, dysuria, frequency, hematuria and urgency  Musculoskeletal: Positive for arthralgias  Negative for back pain, gait problem and myalgias  Skin: Negative for rash  Neurological: Negative for dizziness, speech difficulty, weakness, numbness and headaches  Hematological: Does not bruise/bleed easily  Psychiatric/Behavioral: Negative for agitation, confusion and hallucinations  Objective:  Vitals:    03/05/21 1550   BP: 122/84   BP Location: Left arm   Patient Position: Sitting   Cuff Size: Standard   Pulse: 61   Temp: (!) 97 3 °F (36 3 °C)   TempSrc: Temporal   SpO2: 99%   Weight: 88 5 kg (195 lb)   Height: 5' 5" (1 651 m)     Body mass index is 32 45 kg/m²  Physical Exam  Vitals signs and nursing note reviewed  Constitutional:       Appearance: She is well-developed  HENT:      Head: Normocephalic and atraumatic  Nose: Nose normal    Eyes:      General: No scleral icterus  Conjunctiva/sclera: Conjunctivae normal       Pupils: Pupils are equal, round, and reactive to light  Neck:      Musculoskeletal: Normal range of motion and neck supple  Thyroid: No thyromegaly  Cardiovascular:      Rate and Rhythm: Normal rate and regular rhythm     Pulmonary:      Effort: Pulmonary effort is normal       Breath sounds: Normal breath sounds  No wheezing  Abdominal:      General: Bowel sounds are normal  There is no distension  Palpations: Abdomen is soft  Tenderness: There is no abdominal tenderness  There is no guarding or rebound  Musculoskeletal: Normal range of motion  General: Tenderness present  No deformity  Skin:     General: Skin is warm and dry  Findings: No erythema or rash  Neurological:      Mental Status: She is alert and oriented to person, place, and time  Sensory: No sensory deficit  Psychiatric:         Mood and Affect: Mood normal          Behavior: Behavior normal          Thought Content:  Thought content normal          Judgment: Judgment normal

## 2021-03-05 NOTE — TELEPHONE ENCOUNTER
Upon review of the In Basket request we were able to locate, review, and update the patient chart as requested for CRC: Mattie  Any additional questions or concerns should be emailed to the Practice Liaisons via [email or web address] email, please do not reply via In Basket      Thank you  Pillo Carroll

## 2021-03-12 DIAGNOSIS — F41.9 ANXIETY: ICD-10-CM

## 2021-03-13 RX ORDER — LORAZEPAM 1 MG/1
TABLET ORAL
Qty: 30 TABLET | Refills: 0 | Status: SHIPPED | OUTPATIENT
Start: 2021-03-13 | End: 2021-04-21

## 2021-03-18 ENCOUNTER — TELEMEDICINE (OUTPATIENT)
Dept: FAMILY MEDICINE CLINIC | Facility: CLINIC | Age: 56
End: 2021-03-18
Payer: COMMERCIAL

## 2021-03-18 DIAGNOSIS — J06.9 URI WITH COUGH AND CONGESTION: Primary | ICD-10-CM

## 2021-03-18 DIAGNOSIS — E03.9 HYPOTHYROIDISM, UNSPECIFIED TYPE: ICD-10-CM

## 2021-03-18 PROCEDURE — U0005 INFEC AGEN DETEC AMPLI PROBE: HCPCS | Performed by: NURSE PRACTITIONER

## 2021-03-18 PROCEDURE — G2012 BRIEF CHECK IN BY MD/QHP: HCPCS | Performed by: NURSE PRACTITIONER

## 2021-03-18 PROCEDURE — U0003 INFECTIOUS AGENT DETECTION BY NUCLEIC ACID (DNA OR RNA); SEVERE ACUTE RESPIRATORY SYNDROME CORONAVIRUS 2 (SARS-COV-2) (CORONAVIRUS DISEASE [COVID-19]), AMPLIFIED PROBE TECHNIQUE, MAKING USE OF HIGH THROUGHPUT TECHNOLOGIES AS DESCRIBED BY CMS-2020-01-R: HCPCS | Performed by: NURSE PRACTITIONER

## 2021-03-18 NOTE — PROGRESS NOTES
COVID-19 Virtual Visit     Assessment/Plan:    Problem List Items Addressed This Visit     None      Visit Diagnoses     URI with cough and congestion    -  Primary    Relevant Orders    Novel Coronavirus (Covid-19),PCR [facility] - Collected in Office         10 minutes spent on this call     Encounter provider Michael Farris Mikey Dahl     Provider located at [address] [facility] [address] [phone]    Recent Visits  No visits were found meeting these conditions  Showing recent visits within past 7 days and meeting all other requirements     Today's Visits  Date Type Provider Dept   03/18/21 819 [facility], [address]   Showing today's visits and meeting all other requirements     Future Appointments  No visits were found meeting these conditions  Showing future appointments within next 150 days and meeting all other requirements        Patient agrees to participate in a virtual check in via telephone or video visit instead of presenting to the office to address urgent/immediate medical needs  Patient is aware this is a billable service  After connecting through Telephone, the patient was identified by name and date of birth  Eileen Scott was informed that this was a telemedicine visit and that the exam was being conducted confidentially over secure lines  Eileen Scott acknowledged consent and understanding of privacy and security of the telemedicine visit  I informed the patient that I have reviewed her record in Epic and presented the opportunity for her to ask any questions regarding the visit today  The patient agreed to participate  Subjective:   Eileen Scott is a 48 y o  female who is concerned about COVID-19  Patient's symptoms include sore throat, cough and headache       Exposure:   Contact with a person who is under investigation (PUI) for or who is positive for COVID-19 within the last 14 days?: Yes    Hospitalized recently for fever and/or lower respiratory symptoms?: No      Currently a healthcare worker that is involved in direct patient care?: No      Works in a special setting where the risk of COVID-19 transmission may be high? (this may include long-term care, correctional and alf facilities; homeless shelters; assisted-living facilities and group homes ): No      Resident in a special setting where the risk of COVID-19 transmission may be high? (this may include long-term care, correctional and alf facilities; homeless shelters; assisted-living facilities and group homes ): No      No results found for: Elijah Márquez, [address] [facility], [facility] [address]  Past Medical History:   Diagnosis Date    Abnormal Pap smear of cervix     Bilateral ovarian cysts     Disease of thyroid gland      History reviewed  No pertinent surgical history    Current Outpatient Medications   Medication Sig Dispense Refill    b complex vitamins tablet Take 1 tablet by mouth daily      Cholecalciferol (VITAMIN D-1000 MAX ST PO) Take by mouth      Estradiol (Imvexxy Maintenance Pack) 4 MCG INST 1 vaginal insert twice weekly (Monday and Thursday) 1 each 10    Estradiol Starter Pack (Imvexxy Starter Pack) 4 MCG INST 1 vaginal insert daily for 2 weeks followed by 1 insert twice a week (ex: Monday and Thursday) 1 each 0    levothyroxine 75 mcg tablet TAKE ONE TABLET BY MOUTH EVERY DAY 30 tablet 5    Loratadine (CLARITIN PO) Take by mouth      LORazepam (ATIVAN) 1 mg tablet TAKE ONE TABLET BY MOUTH EVERY DAY 30 tablet 0    metroNIDAZOLE (METROGEL) 1 % gel Apply topically daily 45 g 2    Saccharomyces boulardii (Probiotic) 250 MG CAPS Take by mouth      Turmeric 500 MG CAPS Take by mouth      vitamin E, tocopherol, 400 units capsule Take 400 Units by mouth daily       Current Facility-Administered Medications   Medication Dose Route Frequency Provider Last Rate Last Admin    cyanocobalamin injection 1,000 mcg  1,000 mcg Intramuscular Q30 Days Ilsa Preston MD   1,000 mcg at 03/05/21 1647     Allergies   Allergen Reactions    Aleve [Naproxen] Rash    Penicillins Other (See Comments)     childhood       Review of Systems   HENT: Positive for sore throat  Respiratory: Positive for cough  Neurological: Positive for headaches  Objective: There were no vitals filed for this visit  Physical Exam  Neurological:      Mental Status: She is alert and oriented to person, place, and time  Psychiatric:         Behavior: Behavior normal        VIRTUAL VISIT DISCLAIMER    Nisha Reeder acknowledges that she has consented to an online visit or consultation  She understands that the online visit is based solely on information provided by her, and that, in the absence of a face-to-face physical evaluation by the physician, the diagnosis she receives is both limited and provisional in terms of accuracy and completeness  This is not intended to replace a full medical face-to-face evaluation by the physician  Camilla Fernando understands and accepts these terms

## 2021-03-19 ENCOUNTER — DOCUMENTATION (OUTPATIENT)
Dept: FAMILY MEDICINE CLINIC | Facility: CLINIC | Age: 56
End: 2021-03-19

## 2021-03-19 LAB — SARS-COV-2 RNA RESP QL NAA+PROBE: NEGATIVE

## 2021-03-21 RX ORDER — LEVOTHYROXINE SODIUM 0.07 MG/1
TABLET ORAL
Qty: 30 TABLET | Refills: 5 | Status: SHIPPED | OUTPATIENT
Start: 2021-03-21 | End: 2021-09-27 | Stop reason: SDUPTHER

## 2021-03-26 ENCOUNTER — CLINICAL SUPPORT (OUTPATIENT)
Dept: FAMILY MEDICINE CLINIC | Facility: CLINIC | Age: 56
End: 2021-03-26

## 2021-03-26 VITALS — TEMPERATURE: 98.1 F

## 2021-03-26 DIAGNOSIS — D51.0 PERNICIOUS ANEMIA: Primary | ICD-10-CM

## 2021-04-16 ENCOUNTER — CLINICAL SUPPORT (OUTPATIENT)
Dept: FAMILY MEDICINE CLINIC | Facility: CLINIC | Age: 56
End: 2021-04-16
Payer: COMMERCIAL

## 2021-04-16 VITALS
SYSTOLIC BLOOD PRESSURE: 118 MMHG | OXYGEN SATURATION: 99 % | WEIGHT: 195 LBS | DIASTOLIC BLOOD PRESSURE: 64 MMHG | BODY MASS INDEX: 32.49 KG/M2 | TEMPERATURE: 98.2 F | HEIGHT: 65 IN

## 2021-04-16 DIAGNOSIS — D51.0 PERNICIOUS ANEMIA: Primary | ICD-10-CM

## 2021-04-16 PROCEDURE — 96372 THER/PROPH/DIAG INJ SC/IM: CPT

## 2021-04-16 PROCEDURE — 3008F BODY MASS INDEX DOCD: CPT | Performed by: FAMILY MEDICINE

## 2021-04-16 RX ADMIN — CYANOCOBALAMIN 1000 MCG: 1000 INJECTION INTRAMUSCULAR; SUBCUTANEOUS at 10:25

## 2021-04-21 DIAGNOSIS — F41.9 ANXIETY: ICD-10-CM

## 2021-04-21 RX ORDER — LORAZEPAM 1 MG/1
TABLET ORAL
Qty: 30 TABLET | Refills: 0 | Status: SHIPPED | OUTPATIENT
Start: 2021-04-21 | End: 2021-05-24 | Stop reason: SDUPTHER

## 2021-05-07 ENCOUNTER — HOSPITAL ENCOUNTER (OUTPATIENT)
Dept: RADIOLOGY | Facility: MEDICAL CENTER | Age: 56
Discharge: HOME | End: 2021-05-07
Payer: COMMERCIAL

## 2021-05-07 DIAGNOSIS — E03.9 HYPOTHYROIDISM, UNSPECIFIED TYPE: ICD-10-CM

## 2021-05-07 PROCEDURE — 76536 US EXAM OF HEAD AND NECK: CPT

## 2021-05-20 ENCOUNTER — OFFICE VISIT (OUTPATIENT)
Dept: OBGYN CLINIC | Facility: CLINIC | Age: 56
End: 2021-05-20
Payer: COMMERCIAL

## 2021-05-20 VITALS
WEIGHT: 195 LBS | SYSTOLIC BLOOD PRESSURE: 120 MMHG | DIASTOLIC BLOOD PRESSURE: 64 MMHG | BODY MASS INDEX: 32.49 KG/M2 | HEIGHT: 65 IN

## 2021-05-20 DIAGNOSIS — N94.10 FEMALE DYSPAREUNIA: Primary | ICD-10-CM

## 2021-05-20 PROCEDURE — 1036F TOBACCO NON-USER: CPT | Performed by: NURSE PRACTITIONER

## 2021-05-20 PROCEDURE — 3008F BODY MASS INDEX DOCD: CPT | Performed by: NURSE PRACTITIONER

## 2021-05-20 PROCEDURE — 99212 OFFICE O/P EST SF 10 MIN: CPT | Performed by: NURSE PRACTITIONER

## 2021-05-20 NOTE — PROGRESS NOTES
Annice Apley 61-year-old presents for follow-up after initiating HRT in February with Imvexxy for dyspareunia  Patient denies post-menopausal vaginal bleeding  The patient is not sexually active  GYN screening history: last pap: was normal  She reports improvement of vaginal dryness  She is contemplating discontinuing the HRT because she is not sexually active and she had initially started it to help with dyspareunia  Vitals:    05/20/21 1130   BP: 120/64       ROS:  As indicated in HPI  All other ROS negative  Real Reasons was seen today for follow-up  Diagnoses and all orders for this visit:    Female dyspareunia      She will message me through [employer] if she decides to continue or discontinue with HRT

## 2021-05-24 DIAGNOSIS — F41.9 ANXIETY: ICD-10-CM

## 2021-05-25 RX ORDER — LORAZEPAM 1 MG/1
1 TABLET ORAL DAILY
Qty: 30 TABLET | Refills: 0 | Status: SHIPPED | OUTPATIENT
Start: 2021-05-25 | End: 2021-06-18 | Stop reason: SDUPTHER

## 2021-06-18 DIAGNOSIS — F41.9 ANXIETY: ICD-10-CM

## 2021-06-19 RX ORDER — LORAZEPAM 1 MG/1
1 TABLET ORAL DAILY
Qty: 30 TABLET | Refills: 0 | Status: SHIPPED | OUTPATIENT
Start: 2021-06-19 | End: 2021-07-19 | Stop reason: SDUPTHER

## 2021-07-19 DIAGNOSIS — F41.9 ANXIETY: ICD-10-CM

## 2021-07-19 RX ORDER — LORAZEPAM 1 MG/1
1 TABLET ORAL DAILY
Qty: 30 TABLET | Refills: 0 | Status: SHIPPED | OUTPATIENT
Start: 2021-07-19 | End: 2021-08-23 | Stop reason: SDUPTHER

## 2021-08-19 ENCOUNTER — HOSPITAL ENCOUNTER (OUTPATIENT)
Dept: RADIOLOGY | Facility: MEDICAL CENTER | Age: 56
Discharge: HOME | End: 2021-08-19
Payer: COMMERCIAL

## 2021-08-19 VITALS — HEIGHT: 65 IN | BODY MASS INDEX: 32.49 KG/M2 | WEIGHT: 195 LBS

## 2021-08-19 DIAGNOSIS — Z12.31 ENCOUNTER FOR SCREENING MAMMOGRAM FOR BREAST CANCER: ICD-10-CM

## 2021-08-19 PROCEDURE — 77063 BREAST TOMOSYNTHESIS BI: CPT

## 2021-08-19 PROCEDURE — 77067 SCR MAMMO BI INCL CAD: CPT

## 2021-08-23 DIAGNOSIS — F41.9 ANXIETY: ICD-10-CM

## 2021-08-23 RX ORDER — LORAZEPAM 1 MG/1
1 TABLET ORAL DAILY
Qty: 30 TABLET | Refills: 0 | Status: SHIPPED | OUTPATIENT
Start: 2021-08-23 | End: 2021-09-28

## 2021-09-25 DIAGNOSIS — F41.9 ANXIETY: ICD-10-CM

## 2021-09-25 DIAGNOSIS — E03.9 HYPOTHYROIDISM, UNSPECIFIED TYPE: ICD-10-CM

## 2021-09-25 NOTE — TELEPHONE ENCOUNTER
Patient called to check on the status of this medication  She said her pharmacy made 3 attempts to call the office to have this refilled  It looks like they sent it electronically today  I did let her know that per  policy, controlled substances cannot be called outside of office hours  She is out of this medication and needs it refilled asap  Please call her when this is completed

## 2021-09-27 DIAGNOSIS — E03.9 HYPOTHYROIDISM, UNSPECIFIED TYPE: ICD-10-CM

## 2021-09-28 RX ORDER — LEVOTHYROXINE SODIUM 0.07 MG/1
75 TABLET ORAL DAILY
Qty: 30 TABLET | Refills: 5 | OUTPATIENT
Start: 2021-09-28

## 2021-09-28 RX ORDER — LEVOTHYROXINE SODIUM 0.07 MG/1
75 TABLET ORAL DAILY
Qty: 90 TABLET | Refills: 0 | Status: SHIPPED | OUTPATIENT
Start: 2021-09-28 | End: 2021-11-11 | Stop reason: SDUPTHER

## 2021-09-28 RX ORDER — LORAZEPAM 1 MG/1
TABLET ORAL
Qty: 30 TABLET | Refills: 0 | Status: SHIPPED | OUTPATIENT
Start: 2021-09-28 | End: 2021-10-26

## 2021-10-25 DIAGNOSIS — F41.9 ANXIETY: ICD-10-CM

## 2021-10-26 RX ORDER — LORAZEPAM 1 MG/1
TABLET ORAL
Qty: 30 TABLET | Refills: 0 | Status: SHIPPED | OUTPATIENT
Start: 2021-10-26 | End: 2021-11-11 | Stop reason: SDUPTHER

## 2021-11-10 PROBLEM — E66.09 CLASS 1 OBESITY DUE TO EXCESS CALORIES WITHOUT SERIOUS COMORBIDITY WITH BODY MASS INDEX (BMI) OF 32.0 TO 32.9 IN ADULT: Status: ACTIVE | Noted: 2021-11-10

## 2021-11-10 PROBLEM — E66.811 CLASS 1 OBESITY DUE TO EXCESS CALORIES WITHOUT SERIOUS COMORBIDITY WITH BODY MASS INDEX (BMI) OF 32.0 TO 32.9 IN ADULT: Status: ACTIVE | Noted: 2021-11-10

## 2021-11-11 ENCOUNTER — OFFICE VISIT (OUTPATIENT)
Dept: FAMILY MEDICINE CLINIC | Facility: CLINIC | Age: 56
End: 2021-11-11
Payer: COMMERCIAL

## 2021-11-11 VITALS
BODY MASS INDEX: 32.99 KG/M2 | HEART RATE: 66 BPM | SYSTOLIC BLOOD PRESSURE: 110 MMHG | WEIGHT: 198 LBS | RESPIRATION RATE: 18 BRPM | HEIGHT: 65 IN | OXYGEN SATURATION: 98 % | TEMPERATURE: 98 F | DIASTOLIC BLOOD PRESSURE: 72 MMHG

## 2021-11-11 DIAGNOSIS — R53.83 OTHER FATIGUE: ICD-10-CM

## 2021-11-11 DIAGNOSIS — E03.4 HYPOTHYROIDISM DUE TO ACQUIRED ATROPHY OF THYROID: Primary | ICD-10-CM

## 2021-11-11 DIAGNOSIS — E66.09 CLASS 1 OBESITY DUE TO EXCESS CALORIES WITHOUT SERIOUS COMORBIDITY WITH BODY MASS INDEX (BMI) OF 32.0 TO 32.9 IN ADULT: ICD-10-CM

## 2021-11-11 DIAGNOSIS — L71.9 ROSACEA: ICD-10-CM

## 2021-11-11 DIAGNOSIS — D51.0 PERNICIOUS ANEMIA: ICD-10-CM

## 2021-11-11 DIAGNOSIS — F41.9 ANXIETY: ICD-10-CM

## 2021-11-11 DIAGNOSIS — E03.9 HYPOTHYROIDISM, UNSPECIFIED TYPE: ICD-10-CM

## 2021-11-11 PROCEDURE — 99213 OFFICE O/P EST LOW 20 MIN: CPT | Performed by: FAMILY MEDICINE

## 2021-11-11 PROCEDURE — 96372 THER/PROPH/DIAG INJ SC/IM: CPT | Performed by: FAMILY MEDICINE

## 2021-11-11 RX ORDER — LORAZEPAM 1 MG/1
1 TABLET ORAL DAILY
Qty: 30 TABLET | Refills: 3 | Status: SHIPPED | OUTPATIENT
Start: 2021-11-11 | End: 2022-03-28

## 2021-11-11 RX ORDER — LEVOTHYROXINE SODIUM 0.07 MG/1
75 TABLET ORAL DAILY
Qty: 90 TABLET | Refills: 0 | Status: SHIPPED | OUTPATIENT
Start: 2021-11-11 | End: 2022-03-28

## 2021-11-11 RX ORDER — CYANOCOBALAMIN 1000 UG/ML
1000 INJECTION INTRAMUSCULAR; SUBCUTANEOUS
Status: SHIPPED | OUTPATIENT
Start: 2021-11-11

## 2021-11-11 RX ADMIN — CYANOCOBALAMIN 1000 MCG: 1000 INJECTION INTRAMUSCULAR; SUBCUTANEOUS at 17:31

## 2021-12-09 LAB
ALBUMIN SERPL-MCNC: 4 G/DL (ref 3.6–5.1)
ALBUMIN/GLOB SERPL: 1.5 (CALC) (ref 1–2.5)
ALP SERPL-CCNC: 48 U/L (ref 37–153)
ALT SERPL-CCNC: 16 U/L (ref 6–29)
AST SERPL-CCNC: 16 U/L (ref 10–35)
BILIRUB SERPL-MCNC: 0.8 MG/DL (ref 0.2–1.2)
BUN SERPL-MCNC: 18 MG/DL (ref 7–25)
BUN/CREAT SERPL: NORMAL (CALC) (ref 6–22)
CALCIUM SERPL-MCNC: 9.6 MG/DL (ref 8.6–10.4)
CHLORIDE SERPL-SCNC: 106 MMOL/L (ref 98–110)
CO2 SERPL-SCNC: 28 MMOL/L (ref 20–32)
CREAT SERPL-MCNC: 0.74 MG/DL (ref 0.5–1.05)
GLOBULIN SER CALC-MCNC: 2.7 G/DL (CALC) (ref 1.9–3.7)
GLUCOSE SERPL-MCNC: 88 MG/DL (ref 65–99)
POTASSIUM SERPL-SCNC: 4.3 MMOL/L (ref 3.5–5.3)
PROT SERPL-MCNC: 6.7 G/DL (ref 6.1–8.1)
SL AMB EGFR AFRICAN AMERICAN: 106 ML/MIN/1.73M2
SL AMB EGFR NON AFRICAN AMERICAN: 91 ML/MIN/1.73M2
SODIUM SERPL-SCNC: 140 MMOL/L (ref 135–146)
TSH SERPL-ACNC: 1.94 MIU/L
VIT B12 SERPL-MCNC: 741 PG/ML (ref 200–1100)

## 2022-03-26 DIAGNOSIS — F41.9 ANXIETY: ICD-10-CM

## 2022-03-26 DIAGNOSIS — E03.9 HYPOTHYROIDISM, UNSPECIFIED TYPE: ICD-10-CM

## 2022-03-28 RX ORDER — LEVOTHYROXINE SODIUM 0.07 MG/1
TABLET ORAL
Qty: 90 TABLET | Refills: 0 | Status: SHIPPED | OUTPATIENT
Start: 2022-03-28 | End: 2022-07-11

## 2022-03-28 RX ORDER — LORAZEPAM 1 MG/1
TABLET ORAL
Qty: 30 TABLET | Refills: 3 | Status: SHIPPED | OUTPATIENT
Start: 2022-03-28 | End: 2022-07-26

## 2022-04-11 ENCOUNTER — OFFICE VISIT (OUTPATIENT)
Dept: FAMILY MEDICINE CLINIC | Facility: CLINIC | Age: 57
End: 2022-04-11
Payer: COMMERCIAL

## 2022-04-11 VITALS
SYSTOLIC BLOOD PRESSURE: 124 MMHG | TEMPERATURE: 97.9 F | BODY MASS INDEX: 34.32 KG/M2 | DIASTOLIC BLOOD PRESSURE: 82 MMHG | OXYGEN SATURATION: 99 % | WEIGHT: 206 LBS | HEIGHT: 65 IN | HEART RATE: 89 BPM

## 2022-04-11 DIAGNOSIS — J01.10 ACUTE NON-RECURRENT FRONTAL SINUSITIS: Primary | ICD-10-CM

## 2022-04-11 DIAGNOSIS — J01.00 ACUTE NON-RECURRENT MAXILLARY SINUSITIS: ICD-10-CM

## 2022-04-11 PROCEDURE — 99213 OFFICE O/P EST LOW 20 MIN: CPT | Performed by: FAMILY MEDICINE

## 2022-04-11 RX ORDER — AZITHROMYCIN 250 MG/1
TABLET, FILM COATED ORAL
Qty: 6 TABLET | Refills: 0 | Status: SHIPPED | OUTPATIENT
Start: 2022-04-11 | End: 2022-04-16

## 2022-04-11 RX ORDER — FLUTICASONE PROPIONATE 50 MCG
1 SPRAY, SUSPENSION (ML) NASAL DAILY
Qty: 9.9 ML | Refills: 1 | Status: SHIPPED | OUTPATIENT
Start: 2022-04-11 | End: 2022-05-12

## 2022-04-11 NOTE — PROGRESS NOTES
BMI Counseling: Body mass index is 34 28 kg/m²  The BMI is above normal  Nutrition recommendations include decreasing portion sizes, encouraging healthy choices of fruits and vegetables, decreasing fast food intake, consuming healthier snacks, limiting drinks that contain sugar, moderation in carbohydrate intake, increasing intake of lean protein, reducing intake of saturated and trans fat and reducing intake of cholesterol  Exercise recommendations include exercising 3-5 times per week  No pharmacotherapy was ordered  Patient referred to PCP  Rationale for BMI follow-up plan is due to patient being overweight or obese  Assessment/Plan:         Problem List Items Addressed This Visit     None      Visit Diagnoses     Acute non-recurrent frontal sinusitis    -  Primary    Relevant Medications    azithromycin (Zithromax) 250 mg tablet    Acute non-recurrent maxillary sinusitis                Subjective:      Patient ID: Shaji Avila is a 55 y o  female  Is a 60-year-old female here today for checkup for acute problem  Patient with nasal congestion some ear pressure and ear pain recently was sick with some low-grade fever although she is afebrile now some headache behind her ears  Postnasal drip some sore throat which is now got better  Patient with some mild cough but is nonproductive and has no shortness of breath or wheezing and no nausea vomiting or diarrhea although she does have a decreased appetite  Patient has been self trip medicating with Sudafed and has been this is been helpful for her  Patient also done 3 COVID test home which were negative      The following portions of the patient's history were reviewed and updated as appropriate:   Past Medical History:  She has a past medical history of Abnormal Pap smear of cervix, Bilateral ovarian cysts, and Disease of thyroid gland  ,  _______________________________________________________________________  Medical Problems:  does not have any pertinent problems on file ,  _______________________________________________________________________  Past Surgical History:   has no past surgical history on file ,  _______________________________________________________________________  Family History:  family history includes Breast cancer in her other; Cancer in her maternal grandfather; Diabetes in her father, maternal grandmother, and sister; Heart disease in her father and maternal grandmother; Hypertension in her father and mother; No Known Problems in her paternal grandfather, paternal grandmother, and paternal uncle; Other in her sister; Thyroid disease in her mother ,  _______________________________________________________________________  Social History:   reports that she has never smoked  She has never used smokeless tobacco  She reports current alcohol use  She reports that she does not use drugs  ,  _______________________________________________________________________  Allergies:  is allergic to aleve [naproxen] and penicillins     _______________________________________________________________________  Current Outpatient Medications   Medication Sig Dispense Refill    b complex vitamins tablet Take 1 tablet by mouth daily      Cholecalciferol (VITAMIN D-1000 MAX ST PO) Take by mouth      Glucos-Chondroit-Hyaluron-MSM (GLUCOSAMINE CHONDROITIN JOINT PO) Take by mouth      levothyroxine 75 mcg tablet TAKE ONE TABLET BY MOUTH EVERY DAY 90 tablet 0    Loratadine (CLARITIN PO) Take by mouth      LORazepam (ATIVAN) 1 mg tablet TAKE ONE TABLET BY MOUTH EVERY DAY 30 tablet 3    metroNIDAZOLE (METROGEL) 1 % gel Apply topically daily 45 g 2    Saccharomyces boulardii (Probiotic) 250 MG CAPS Take by mouth      Turmeric 500 MG CAPS Take by mouth      vitamin E, tocopherol, 400 units capsule Take 400 Units by mouth daily      azithromycin (Zithromax) 250 mg tablet Take 2 tablets (500 mg total) by mouth daily for 1 day, THEN 1 tablet (250 mg total) daily for 4 days  6 tablet 0     Current Facility-Administered Medications   Medication Dose Route Frequency Provider Last Rate Last Admin    cyanocobalamin injection 1,000 mcg  1,000 mcg Intramuscular Q30 Days Deyanira Castanon MD   1,000 mcg at 04/16/21 1025    cyanocobalamin injection 1,000 mcg  1,000 mcg Intramuscular Q30 Days Deyanira Castanon MD   1,000 mcg at 11/11/21 1731     _______________________________________________________________________  Review of Systems   Constitutional: Positive for chills and fever  Negative for activity change, appetite change and fatigue  HENT: Positive for ear pain, postnasal drip, sinus pressure, sinus pain, sneezing and sore throat  Negative for congestion and rhinorrhea  Eyes: Negative for pain and redness  Respiratory: Negative for apnea, cough, chest tightness, shortness of breath and wheezing  Cardiovascular: Negative for chest pain, palpitations and leg swelling  Gastrointestinal: Negative for abdominal pain, constipation, diarrhea, nausea and vomiting  Endocrine: Negative for cold intolerance and heat intolerance  Genitourinary: Negative for difficulty urinating, dysuria, frequency, hematuria and urgency  Musculoskeletal: Negative for arthralgias, back pain, gait problem and myalgias  Skin: Negative for rash  Neurological: Negative for dizziness, speech difficulty, weakness, numbness and headaches  Hematological: Does not bruise/bleed easily  Psychiatric/Behavioral: Negative for agitation, confusion and hallucinations  Objective:  Vitals:    04/11/22 1359   BP: 124/82   BP Location: Left arm   Patient Position: Sitting   Cuff Size: Standard   Pulse: 89   Temp: 97 9 °F (36 6 °C)   TempSrc: Skin   SpO2: 99%   Weight: 93 4 kg (206 lb)   Height: 5' 5" (1 651 m)     Body mass index is 34 28 kg/m²  Physical Exam  Vitals and nursing note reviewed  Constitutional:       Appearance: She is well-developed  She is ill-appearing     HENT: Head: Normocephalic and atraumatic  Right Ear: Tympanic membrane, ear canal and external ear normal  There is no impacted cerumen  Left Ear: Tympanic membrane, ear canal and external ear normal  There is no impacted cerumen  Nose: Congestion and rhinorrhea present  Mouth/Throat:      Mouth: Mucous membranes are moist       Comments: Green post nasal drip  Eyes:      General: No scleral icterus  Conjunctiva/sclera: Conjunctivae normal       Pupils: Pupils are equal, round, and reactive to light  Neck:      Thyroid: No thyromegaly  Cardiovascular:      Rate and Rhythm: Normal rate and regular rhythm  Pulmonary:      Effort: Pulmonary effort is normal       Breath sounds: Normal breath sounds  No wheezing  Abdominal:      General: Bowel sounds are normal  There is no distension  Palpations: Abdomen is soft  Tenderness: There is no abdominal tenderness  There is no guarding or rebound  Musculoskeletal:         General: No tenderness or deformity  Normal range of motion  Cervical back: Normal range of motion and neck supple  Skin:     General: Skin is warm and dry  Findings: No erythema or rash  Neurological:      Mental Status: She is alert and oriented to person, place, and time  Sensory: No sensory deficit  Psychiatric:         Mood and Affect: Mood normal          Behavior: Behavior normal          Thought Content:  Thought content normal          Judgment: Judgment normal

## 2022-04-21 ENCOUNTER — TELEPHONE (OUTPATIENT)
Dept: FAMILY MEDICINE CLINIC | Facility: CLINIC | Age: 57
End: 2022-04-21

## 2022-04-21 DIAGNOSIS — J01.10 ACUTE NON-RECURRENT FRONTAL SINUSITIS: Primary | ICD-10-CM

## 2022-04-21 RX ORDER — DOXYCYCLINE HYCLATE 100 MG/1
100 CAPSULE ORAL EVERY 12 HOURS SCHEDULED
Qty: 20 CAPSULE | Refills: 0 | Status: SHIPPED | OUTPATIENT
Start: 2022-04-21 | End: 2022-05-01

## 2022-04-21 NOTE — TELEPHONE ENCOUNTER
Chanten call in doxycycline for 10 days  She needs to take medicine OTC for symptoms like mucinex and flonase nasal spray  If not better or worse, she needs a recheck in the office

## 2022-04-21 NOTE — TELEPHONE ENCOUNTER
Patient called stating the antibiotic prescribed did not help her and is asking if something else can be sent to her pharmacy

## 2022-05-11 PROBLEM — F41.9 ANXIETY: Status: ACTIVE | Noted: 2022-05-11

## 2022-05-11 NOTE — PROGRESS NOTES
Assessment/Plan:         Problem List Items Addressed This Visit        Endocrine    Hypothyroidism - Primary     Patient to continue current dose of thyroid medicine and recheck TSH in 6 months           Relevant Orders    TSH + Free T4    US thyroid       Other    Class 1 obesity due to excess calories without serious comorbidity with body mass index (BMI) of 32 0 to 32 9 in adult     Patient to increase exercise and partake of a diet with less calories to promote  weight loss           Anxiety     Patient to continue utilizing medical therapy as well as counseling sources as applicable to condition  If suicidal thought or fear of suicide attempt, to call 911 and seek help immediately  Medications and therapy reviewed today and all patient  questions answered today  Other Visit Diagnoses     Well adult exam        Other fatigue        Relevant Orders    Comprehensive metabolic panel    CBC and differential    Magnesium    Uric acid    Urinalysis with microscopic    Need for hepatitis C screening test        Relevant Orders    Hepatitis C antibody    Screening cholesterol level        Relevant Orders    Lipid Panel with Direct LDL reflex    Vitamin B12 deficiency        Relevant Orders    Vitamin B12    Screening mammogram for high-risk patient        Relevant Orders    Mammo screening bilateral w 3d & cad            Subjective:      Patient ID: Jamaal Munson is a 55 y o  female  This is a 56-year-old female here today for her yearly physical exam   We discussed with her her use of medications and her problems of hypothyroidism and some anxiety  PE also discussed with her about screening for her mammogram as well as the lab work she is due for this year  Patient was requesting a B12 level because she takes vitamin-B complex and I will add that to her regimen  Patient not sure if she needs refills and will call when she needs them will review refill as appropriate    Patient also is due for her ultrasound of her thyroid we did that yearly  Patient's hypothyroidism were assessing thyroid structure will integrity  Patient also had Cologuard 2 years ago on reminder that she is due for that next year  The following portions of the patient's history were reviewed and updated as appropriate:   Past Medical History:  She has a past medical history of Abnormal Pap smear of cervix, Bilateral ovarian cysts, and Disease of thyroid gland  ,  _______________________________________________________________________  Medical Problems:  does not have any pertinent problems on file ,  _______________________________________________________________________  Past Surgical History:   has no past surgical history on file ,  _______________________________________________________________________  Family History:  family history includes Breast cancer in her other; Cancer in her maternal grandfather; Diabetes in her father, maternal grandmother, and sister; Heart disease in her father and maternal grandmother; Hypertension in her father and mother; No Known Problems in her paternal grandfather, paternal grandmother, and paternal uncle; Other in her sister; Thyroid disease in her mother ,  _______________________________________________________________________  Social History:   reports that she has never smoked  She has never used smokeless tobacco  She reports current alcohol use  She reports that she does not use drugs  ,  _______________________________________________________________________  Allergies:  is allergic to aleve [naproxen] and penicillins     _______________________________________________________________________  Current Outpatient Medications   Medication Sig Dispense Refill    b complex vitamins tablet Take 1 tablet by mouth daily      Cholecalciferol (VITAMIN D-1000 MAX ST PO) Take by mouth      Glucos-Chondroit-Hyaluron-MSM (GLUCOSAMINE CHONDROITIN JOINT PO) Take by mouth      levothyroxine 75 mcg tablet TAKE ONE TABLET BY MOUTH EVERY DAY 90 tablet 0    Loratadine (CLARITIN PO) Take by mouth      LORazepam (ATIVAN) 1 mg tablet TAKE ONE TABLET BY MOUTH EVERY DAY 30 tablet 3    metroNIDAZOLE (METROGEL) 1 % gel Apply topically daily 45 g 2    Saccharomyces boulardii (Probiotic) 250 MG CAPS Take by mouth      Turmeric 500 MG CAPS Take by mouth      vitamin E, tocopherol, 400 units capsule Take 400 Units by mouth daily       Current Facility-Administered Medications   Medication Dose Route Frequency Provider Last Rate Last Admin    cyanocobalamin injection 1,000 mcg  1,000 mcg Intramuscular Q30 Days Gabriel Clement MD   1,000 mcg at 04/16/21 1025    cyanocobalamin injection 1,000 mcg  1,000 mcg Intramuscular Q30 Days Gabriel Clement MD   1,000 mcg at 11/11/21 1731     _______________________________________________________________________  Review of Systems   Constitutional: Negative for activity change, appetite change, fatigue and fever  HENT: Negative for congestion, ear pain, postnasal drip, rhinorrhea, sinus pressure, sinus pain, sneezing and sore throat  Eyes: Negative for pain and redness  Respiratory: Negative for apnea, cough, chest tightness, shortness of breath and wheezing  Cardiovascular: Negative for chest pain, palpitations and leg swelling  Gastrointestinal: Negative for abdominal pain, constipation, diarrhea, nausea and vomiting  Endocrine: Negative for cold intolerance and heat intolerance  Genitourinary: Negative for difficulty urinating, dysuria, frequency, hematuria and urgency  Musculoskeletal: Negative for arthralgias, back pain, gait problem and myalgias  Skin: Negative for rash  Neurological: Negative for dizziness, speech difficulty, weakness, numbness and headaches  Hematological: Does not bruise/bleed easily  Psychiatric/Behavioral: Negative for agitation, confusion and hallucinations           Objective:  Vitals:    05/12/22 1308   BP: 112/80   BP Location: Left arm   Patient Position: Sitting   Cuff Size: Large   Pulse: 70   Temp: (!) 97 3 °F (36 3 °C)   TempSrc: Skin   SpO2: 99%   Weight: 93 4 kg (206 lb)   Height: 5' 3" (1 6 m)     Body mass index is 36 49 kg/m²  Physical Exam  Vitals and nursing note reviewed  Constitutional:       Appearance: She is well-developed  HENT:      Head: Normocephalic and atraumatic  Right Ear: Tympanic membrane, ear canal and external ear normal       Left Ear: Tympanic membrane, ear canal and external ear normal       Nose: Nose normal       Mouth/Throat:      Mouth: Mucous membranes are moist    Eyes:      General: No scleral icterus  Conjunctiva/sclera: Conjunctivae normal       Pupils: Pupils are equal, round, and reactive to light  Neck:      Thyroid: No thyromegaly  Cardiovascular:      Rate and Rhythm: Normal rate and regular rhythm  Pulmonary:      Effort: Pulmonary effort is normal       Breath sounds: Normal breath sounds  No wheezing  Abdominal:      General: Bowel sounds are normal  There is no distension  Palpations: Abdomen is soft  Tenderness: There is no abdominal tenderness  There is no guarding or rebound  Musculoskeletal:         General: No tenderness or deformity  Normal range of motion  Cervical back: Normal range of motion and neck supple  Skin:     General: Skin is warm and dry  Findings: No erythema or rash  Neurological:      Mental Status: She is alert and oriented to person, place, and time  Sensory: No sensory deficit  Psychiatric:         Mood and Affect: Mood normal          Behavior: Behavior normal          Thought Content:  Thought content normal          Judgment: Judgment normal

## 2022-05-12 ENCOUNTER — OFFICE VISIT (OUTPATIENT)
Dept: FAMILY MEDICINE CLINIC | Facility: CLINIC | Age: 57
End: 2022-05-12
Payer: COMMERCIAL

## 2022-05-12 VITALS
WEIGHT: 206 LBS | HEIGHT: 63 IN | DIASTOLIC BLOOD PRESSURE: 80 MMHG | SYSTOLIC BLOOD PRESSURE: 112 MMHG | OXYGEN SATURATION: 99 % | BODY MASS INDEX: 36.5 KG/M2 | TEMPERATURE: 97.3 F | HEART RATE: 70 BPM

## 2022-05-12 DIAGNOSIS — F41.9 ANXIETY: ICD-10-CM

## 2022-05-12 DIAGNOSIS — Z13.220 SCREENING CHOLESTEROL LEVEL: ICD-10-CM

## 2022-05-12 DIAGNOSIS — Z11.59 NEED FOR HEPATITIS C SCREENING TEST: ICD-10-CM

## 2022-05-12 DIAGNOSIS — Z00.00 WELL ADULT EXAM: ICD-10-CM

## 2022-05-12 DIAGNOSIS — E53.8 VITAMIN B12 DEFICIENCY: ICD-10-CM

## 2022-05-12 DIAGNOSIS — R53.83 OTHER FATIGUE: ICD-10-CM

## 2022-05-12 DIAGNOSIS — E66.09 CLASS 1 OBESITY DUE TO EXCESS CALORIES WITHOUT SERIOUS COMORBIDITY WITH BODY MASS INDEX (BMI) OF 32.0 TO 32.9 IN ADULT: ICD-10-CM

## 2022-05-12 DIAGNOSIS — E03.4 HYPOTHYROIDISM DUE TO ACQUIRED ATROPHY OF THYROID: Primary | ICD-10-CM

## 2022-05-12 DIAGNOSIS — Z12.31 SCREENING MAMMOGRAM FOR HIGH-RISK PATIENT: ICD-10-CM

## 2022-05-12 PROCEDURE — 99396 PREV VISIT EST AGE 40-64: CPT | Performed by: FAMILY MEDICINE

## 2022-05-30 ENCOUNTER — NURSE TRIAGE (OUTPATIENT)
Dept: OTHER | Facility: OTHER | Age: 57
End: 2022-05-30

## 2022-05-30 DIAGNOSIS — L25.5 DERMATITIS DUE TO PLANTS, INCLUDING POISON IVY, SUMAC, AND OAK: ICD-10-CM

## 2022-05-30 DIAGNOSIS — L23.7 POISON IVY: Primary | ICD-10-CM

## 2022-05-30 DIAGNOSIS — L29.9 PRURITUS: ICD-10-CM

## 2022-05-30 NOTE — TELEPHONE ENCOUNTER
Reason for Disposition   SEVERE itching (e g , interferes with sleep or normal activities)    Answer Assessment - Initial Assessment Questions  1  APPEARANCE of RASH: "Describe the rash "       Red,raised,itchiness, not to blistery due to using alcohol on it to dry it out  2  LOCATION: "Where is the rash located?"       Ankles and forearm  3  SIZE: "How large is the rash?"       Fairly large  4  ONSET: "When did the rash begin?"       Saturday after weeding all day  5  ITCHING: "Does the rash itch?" If Yes, ask: "How bad is it?"    - MILD - doesn't interfere with normal activities    - MODERATE-SEVERE: interferes with work, school, sleep, or other activities       Moderate- only use Alcohol to dry it out  Anti itch creams  6   PREGNANCY: "Is there any chance you are pregnant?" "When was your last menstrual period?"      N/A    Protocols used: POISON IVY - [place] - SUM-ADULT-

## 2022-05-30 NOTE — TELEPHONE ENCOUNTER
Called patient to follow up on page from on call nursing  Currently the patient is having mild to moderate symptoms on her ankles and forearms  Does not seem to be extensive and patient has been drying area with rubbing alcohol per nurse triage  The patient requested steroids  After reviewing symptoms and extent of the rash will consider potent topical steroid to help calm rash and prevent further spread

## 2022-05-30 NOTE — TELEPHONE ENCOUNTER
Regarding: Poison Ivy  ----- Message from Leti Briggs RN sent at 5/30/2022 10:29 AM EDT -----  "I have poison ivy and I would like something ordered from the on call  "

## 2022-05-31 ENCOUNTER — TELEPHONE (OUTPATIENT)
Dept: FAMILY MEDICINE CLINIC | Facility: CLINIC | Age: 57
End: 2022-05-31

## 2022-05-31 DIAGNOSIS — L23.7 POISON IVY DERMATITIS: Primary | ICD-10-CM

## 2022-05-31 RX ORDER — METHYLPREDNISOLONE 4 MG/1
TABLET ORAL
Qty: 21 EACH | Refills: 0 | Status: SHIPPED | OUTPATIENT
Start: 2022-05-31

## 2022-05-31 NOTE — TELEPHONE ENCOUNTER
Pt called and stated that she has poison on her arms and ankles and was given a cream with the on call and is looking to see if you can call in a script for poison ivy for her

## 2022-06-12 ENCOUNTER — OFFICE VISIT (OUTPATIENT)
Dept: URGENT CARE | Facility: CLINIC | Age: 57
End: 2022-06-12
Payer: COMMERCIAL

## 2022-06-12 VITALS
TEMPERATURE: 97.2 F | HEIGHT: 65 IN | SYSTOLIC BLOOD PRESSURE: 139 MMHG | DIASTOLIC BLOOD PRESSURE: 67 MMHG | RESPIRATION RATE: 18 BRPM | HEART RATE: 77 BPM | BODY MASS INDEX: 34.32 KG/M2 | OXYGEN SATURATION: 98 % | WEIGHT: 206 LBS

## 2022-06-12 DIAGNOSIS — R35.0 URINE FREQUENCY: Primary | ICD-10-CM

## 2022-06-12 LAB
SL AMB  POCT GLUCOSE, UA: NEGATIVE
SL AMB LEUKOCYTE ESTERASE,UA: NEGATIVE
SL AMB POCT BILIRUBIN,UA: NEGATIVE
SL AMB POCT BLOOD,UA: NEGATIVE
SL AMB POCT CLARITY,UA: CLEAR
SL AMB POCT COLOR,UA: NORMAL
SL AMB POCT KETONES,UA: NEGATIVE
SL AMB POCT NITRITE,UA: NEGATIVE
SL AMB POCT PH,UA: 8.5
SL AMB POCT SPECIFIC GRAVITY,UA: 1
SL AMB POCT URINE PROTEIN: NEGATIVE
SL AMB POCT UROBILINOGEN: 0.2

## 2022-06-12 PROCEDURE — 81002 URINALYSIS NONAUTO W/O SCOPE: CPT | Performed by: NURSE PRACTITIONER

## 2022-06-12 PROCEDURE — 99213 OFFICE O/P EST LOW 20 MIN: CPT | Performed by: NURSE PRACTITIONER

## 2022-06-12 NOTE — PROGRESS NOTES
[facility]        NAME: Nasra Garcia is a 55 y o  female  : 1965    MRN: [id number]  DATE: 2022  TIME: 3:41 PM    Assessment and Plan   Urine frequency [R35 0]  1  Urine frequency  POCT urine dip         Patient Instructions       Follow up with PCP in 3-5 days  Proceed to  ER if symptoms worsen  Patient Instructions   Urine normal in office today  Discontinue herbal supplementation as this may have skewed urine results in office today  Hydrate adequately  Advised to follow up with PCP in 3-5 days for any dysuria, persistent blood in urine or contact gynecology for annual follow up to rule out vaginal discharge as cause of bleeding  Chief Complaint     Chief Complaint   Patient presents with    Possible UTI     Blood in urine, pressure in abd and urine frequency started 10 days  History of Present Illness       Symptoms spotting noticed 2-3 days ago from vaginal area with some low abdominal discomfort  Menopausal   Taking daily "urinary health" supplement bought on  E [facility] [address] x 1 week  Patient states positive blood in urine yesterday and with wiping  No dysuria  Review of Systems   Review of Systems   Constitutional: Negative for fever  HENT: Negative for congestion, ear pain, sneezing and sore throat  Eyes: Negative for discharge and redness  Respiratory: Negative for cough, chest tightness and shortness of breath  Cardiovascular: Negative for chest pain  Gastrointestinal: Positive for abdominal pain (low mid abdominal/adnexal )  Negative for diarrhea, nausea and vomiting  Genitourinary: Positive for frequency (mild increase noticed recently) and hematuria  Negative for decreased urine volume and dysuria  Musculoskeletal: Negative for myalgias  Allergic/Immunologic: Negative for environmental allergies  Neurological: Negative for dizziness, syncope and headaches  Hematological: Negative for adenopathy     Psychiatric/Behavioral: Negative for sleep disturbance           Current Medications       Current Outpatient Medications:     b complex vitamins tablet, Take 1 tablet by mouth daily, Disp: , Rfl:     betamethasone valerate (VALISONE) 0 1 % cream, Apply topically 2 (two) times a day, Disp: 45 g, Rfl: 0    Cholecalciferol (VITAMIN D-1000 MAX ST PO), Take by mouth, Disp: , Rfl:     Glucos-Chondroit-Hyaluron-MSM (GLUCOSAMINE CHONDROITIN JOINT PO), Take by mouth, Disp: , Rfl:     levothyroxine 75 mcg tablet, TAKE ONE TABLET BY MOUTH EVERY DAY, Disp: 90 tablet, Rfl: 0    Loratadine (CLARITIN PO), Take by mouth, Disp: , Rfl:     LORazepam (ATIVAN) 1 mg tablet, TAKE ONE TABLET BY MOUTH EVERY DAY, Disp: 30 tablet, Rfl: 3    metroNIDAZOLE (METROGEL) 1 % gel, Apply topically daily, Disp: 45 g, Rfl: 2    Saccharomyces boulardii (Probiotic) 250 MG CAPS, Take by mouth, Disp: , Rfl:     Turmeric 500 MG CAPS, Take by mouth, Disp: , Rfl:     vitamin E, tocopherol, 400 units capsule, Take 400 Units by mouth daily, Disp: , Rfl:     methylPREDNISolone 4 MG tablet therapy pack, Use as directed on package (Patient not taking: Reported on 6/12/2022), Disp: 21 each, Rfl: 0    Current Facility-Administered Medications:     cyanocobalamin injection 1,000 mcg, 1,000 mcg, Intramuscular, Q30 Days, Gabriel Clement MD, 1,000 mcg at 04/16/21 1025    cyanocobalamin injection 1,000 mcg, 1,000 mcg, Intramuscular, Q30 Days, Gabriel Clement MD, 1,000 mcg at 11/11/21 1731    Current Allergies     Allergies as of 06/12/2022 - Reviewed 06/12/2022   Allergen Reaction Noted    Aleve [naproxen] Rash 11/16/2019    Penicillins Other (See Comments) 11/16/2019            The following portions of the patient's history were reviewed and updated as appropriate: allergies, current medications, past family history, past medical history, past social history, past surgical history and problem list      Past Medical History:   Diagnosis Date    Abnormal Pap smear of cervix  Bilateral ovarian cysts     Disease of thyroid gland        History reviewed  No pertinent surgical history  Family History   Problem Relation Age of Onset    Thyroid disease Mother     Hypertension Mother     Heart disease Father     Diabetes Father     Hypertension Father     Diabetes Sister     Other Sister         car accident    Heart disease Maternal Grandmother     Diabetes Maternal Grandmother     Cancer Maternal Grandfather     No Known Problems Paternal Grandmother     No Known Problems Paternal Grandfather     Breast cancer Other     No Known Problems Paternal Uncle          Medications have been verified  Objective   /67   Pulse 77   Temp (!) 97 2 °F (36 2 °C) (Temporal)   Resp 18   Ht 5' 5" (1 651 m)   Wt 93 4 kg (206 lb)   SpO2 98%   BMI 34 28 kg/m²   No LMP recorded  Patient is postmenopausal        Physical Exam     Physical Exam  Vitals reviewed  Constitutional:       General: She is not in acute distress  Appearance: She is well-developed and well-groomed  She is not ill-appearing  HENT:      Head: Normocephalic  Nose: Nose normal       Mouth/Throat:      Lips: Pink  Mouth: Mucous membranes are moist       Pharynx: No posterior oropharyngeal erythema  Eyes:      General: Lids are normal          Right eye: No discharge  Left eye: No discharge  Cardiovascular:      Rate and Rhythm: Regular rhythm  Heart sounds: Normal heart sounds, S1 normal and S2 normal    Pulmonary:      Effort: Pulmonary effort is normal       Breath sounds: Normal breath sounds  No decreased breath sounds, wheezing or rhonchi  Abdominal:      General: Bowel sounds are normal  There is no distension  Palpations: Abdomen is soft  There is no hepatomegaly, splenomegaly or mass  Tenderness: There is no abdominal tenderness  There is no right CVA tenderness or left CVA tenderness  Musculoskeletal:      Cervical back: Normal range of motion  Lymphadenopathy:      Cervical: No cervical adenopathy  Skin:     General: Skin is warm and dry  Neurological:      Mental Status: She is alert  Psychiatric:         Attention and Perception: Attention normal          Mood and Affect: Mood normal          Speech: Speech normal          Behavior: Behavior normal  Behavior is cooperative

## 2022-06-12 NOTE — PATIENT INSTRUCTIONS
Urine normal in office today  Discontinue herbal supplementation as this may have skewed urine results in office today  Hydrate adequately  Advised to follow up with PCP in 3-5 days for any dysuria, persistent blood in urine or contact gynecology for annual follow up to rule out vaginal discharge as cause of bleeding

## 2022-06-20 LAB
ALBUMIN SERPL-MCNC: 4.3 G/DL (ref 3.6–5.1)
ALBUMIN/GLOB SERPL: 1.7 (CALC) (ref 1–2.5)
ALP SERPL-CCNC: 46 U/L (ref 37–153)
ALT SERPL-CCNC: 21 U/L (ref 6–29)
APPEARANCE UR: CLEAR
AST SERPL-CCNC: 19 U/L (ref 10–35)
BACTERIA UR QL AUTO: ABNORMAL /HPF
BASOPHILS # BLD AUTO: 60 CELLS/UL (ref 0–200)
BASOPHILS NFR BLD AUTO: 1.2 %
BILIRUB SERPL-MCNC: 0.7 MG/DL (ref 0.2–1.2)
BILIRUB UR QL STRIP: NEGATIVE
BUN SERPL-MCNC: 23 MG/DL (ref 7–25)
BUN/CREAT SERPL: NORMAL (CALC) (ref 6–22)
CALCIUM SERPL-MCNC: 9.2 MG/DL (ref 8.6–10.4)
CHLORIDE SERPL-SCNC: 106 MMOL/L (ref 98–110)
CHOLEST SERPL-MCNC: 153 MG/DL
CHOLEST/HDLC SERPL: 4 (CALC)
CO2 SERPL-SCNC: 26 MMOL/L (ref 20–32)
COLOR UR: YELLOW
CREAT SERPL-MCNC: 0.85 MG/DL (ref 0.5–1.05)
EOSINOPHIL # BLD AUTO: 340 CELLS/UL (ref 15–500)
EOSINOPHIL NFR BLD AUTO: 6.8 %
ERYTHROCYTE [DISTWIDTH] IN BLOOD BY AUTOMATED COUNT: 12.1 % (ref 11–15)
GLOBULIN SER CALC-MCNC: 2.5 G/DL (CALC) (ref 1.9–3.7)
GLUCOSE SERPL-MCNC: 88 MG/DL (ref 65–99)
GLUCOSE UR QL STRIP: NEGATIVE
HCT VFR BLD AUTO: 42.1 % (ref 35–45)
HCV AB S/CO SERPL IA: 0.06
HCV AB SERPL QL IA: NORMAL
HDLC SERPL-MCNC: 38 MG/DL
HGB BLD-MCNC: 13.9 G/DL (ref 11.7–15.5)
HGB UR QL STRIP: NEGATIVE
HYALINE CASTS #/AREA URNS LPF: ABNORMAL /LPF
KETONES UR QL STRIP: NEGATIVE
LDLC SERPL CALC-MCNC: 94 MG/DL (CALC)
LEUKOCYTE ESTERASE UR QL STRIP: ABNORMAL
LYMPHOCYTES # BLD AUTO: 1675 CELLS/UL (ref 850–3900)
LYMPHOCYTES NFR BLD AUTO: 33.5 %
MAGNESIUM SERPL-MCNC: 2 MG/DL (ref 1.5–2.5)
MCH RBC QN AUTO: 30.7 PG (ref 27–33)
MCHC RBC AUTO-ENTMCNC: 33 G/DL (ref 32–36)
MCV RBC AUTO: 92.9 FL (ref 80–100)
MONOCYTES # BLD AUTO: 430 CELLS/UL (ref 200–950)
MONOCYTES NFR BLD AUTO: 8.6 %
NEUTROPHILS # BLD AUTO: 2495 CELLS/UL (ref 1500–7800)
NEUTROPHILS NFR BLD AUTO: 49.9 %
NITRITE UR QL STRIP: NEGATIVE
NONHDLC SERPL-MCNC: 115 MG/DL (CALC)
PH UR STRIP: 6 [PH] (ref 5–8)
PLATELET # BLD AUTO: 223 THOUSAND/UL (ref 140–400)
PMV BLD REES-ECKER: 9.8 FL (ref 7.5–12.5)
POTASSIUM SERPL-SCNC: 4.2 MMOL/L (ref 3.5–5.3)
PROT SERPL-MCNC: 6.8 G/DL (ref 6.1–8.1)
PROT UR QL STRIP: NEGATIVE
RBC # BLD AUTO: 4.53 MILLION/UL (ref 3.8–5.1)
RBC #/AREA URNS HPF: ABNORMAL /HPF
SL AMB EGFR AFRICAN AMERICAN: 89 ML/MIN/1.73M2
SL AMB EGFR NON AFRICAN AMERICAN: 77 ML/MIN/1.73M2
SODIUM SERPL-SCNC: 139 MMOL/L (ref 135–146)
SP GR UR STRIP: 1.01 (ref 1–1.03)
SQUAMOUS #/AREA URNS HPF: ABNORMAL /HPF
T4 FREE SERPL-MCNC: 1.2 NG/DL (ref 0.8–1.8)
TRIGL SERPL-MCNC: 109 MG/DL
TSH SERPL-ACNC: 2.46 MIU/L
URATE SERPL-MCNC: 6.9 MG/DL (ref 2.5–7)
VIT B12 SERPL-MCNC: 654 PG/ML (ref 200–1100)
WBC # BLD AUTO: 5 THOUSAND/UL (ref 3.8–10.8)
WBC #/AREA URNS HPF: ABNORMAL /HPF

## 2022-07-10 DIAGNOSIS — E03.9 HYPOTHYROIDISM, UNSPECIFIED TYPE: ICD-10-CM

## 2022-07-11 RX ORDER — LEVOTHYROXINE SODIUM 0.07 MG/1
TABLET ORAL
Qty: 90 TABLET | Refills: 0 | Status: SHIPPED | OUTPATIENT
Start: 2022-07-11 | End: 2022-10-13

## 2022-07-26 DIAGNOSIS — F41.9 ANXIETY: ICD-10-CM

## 2022-07-26 RX ORDER — LORAZEPAM 1 MG/1
TABLET ORAL
Qty: 30 TABLET | Refills: 3 | Status: SHIPPED | OUTPATIENT
Start: 2022-07-26

## 2022-10-13 DIAGNOSIS — E03.9 HYPOTHYROIDISM, UNSPECIFIED TYPE: ICD-10-CM

## 2022-10-13 RX ORDER — LEVOTHYROXINE SODIUM 0.07 MG/1
TABLET ORAL
Qty: 90 TABLET | Refills: 0 | Status: SHIPPED | OUTPATIENT
Start: 2022-10-13

## 2022-10-19 ENCOUNTER — VBI (OUTPATIENT)
Dept: ADMINISTRATIVE | Facility: OTHER | Age: 57
End: 2022-10-19

## 2022-10-24 ENCOUNTER — TELEPHONE (OUTPATIENT)
Dept: FAMILY MEDICINE CLINIC | Facility: CLINIC | Age: 57
End: 2022-10-24

## 2022-10-24 ENCOUNTER — TELEMEDICINE (OUTPATIENT)
Dept: FAMILY MEDICINE CLINIC | Facility: CLINIC | Age: 57
End: 2022-10-24
Payer: COMMERCIAL

## 2022-10-24 DIAGNOSIS — S10.96XA TICK BITE OF NECK, INITIAL ENCOUNTER: Primary | ICD-10-CM
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
## 2022-10-24 PROCEDURE — 99213 OFFICE O/P EST LOW 20 MIN: CPT | Performed by: NURSE PRACTITIONER

## 2022-10-24 RX ORDER — DOXYCYCLINE HYCLATE 100 MG/1
100 CAPSULE ORAL EVERY 12 HOURS SCHEDULED
Qty: 2 CAPSULE | Refills: 0 | Status: SHIPPED | OUTPATIENT
Start: 2022-10-24 | End: 2022-10-25

## 2022-10-24 NOTE — PROGRESS NOTES
Virtual Regular Visit    Verification of patient location:    Patient is located in the following state in which I hold an active license [place]      Assessment/Plan:  Small red lesion left side of neck  Where tick was removed  Does not appear to be infected appears to gone the whole tick  I did advised will send single double dose doxycycline to be taken today as tick was removed today order for Lyme study is placed if she has any increase in symptoms such is discomfort of joints swelling erythema she can complete  Additional antibiotic will be supplied if positive for Lyme  Dosing all possible side effects of the prescribed medications or medications that had been prescribed in the past were reviewed and all questions were answered  Patient verbalized agreement and understanding of the plan of care as outlined during the office visit today return to office as indicated or sooner if a problem arises  Problem List Items Addressed This Visit    None     Visit Diagnoses     Tick bite of neck, initial encounter    -  Primary    Relevant Medications    doxycycline hyclate (VIBRAMYCIN) 100 mg capsule    Other Relevant Orders    Lyme Disease Serology w/Reflex               Reason for visit is   Chief Complaint   Patient presents with   • Virtual Regular Visit        Encounter provider VIET Hearn    Provider located at [address] [facility] [id number] [phone]      Recent Visits  No visits were found meeting these conditions  Showing recent visits within past 7 days and meeting all other requirements  Today's Visits  Date Type Provider Dept   10/24/22 819 [facility], [address]   10/24/22 Telephone [address]   Showing today's visits and meeting all other requirements  Future Appointments  No visits were found meeting these conditions    Showing future appointments within next 150 days and meeting all other requirements       The patient was identified by name and date of birth  Ray Staton was informed that this is a telemedicine visit and that the visit is being conducted through the [address] [employer] platform  She agrees to proceed     My office door was closed  No one else was in the room  She acknowledged consent and understanding of privacy and security of the video platform  The patient has agreed to participate and understands they can discontinue the visit at any time  Patient is aware this is a billable service  Mikey Cao is a 65 y o  female    Patient being seen today via virtual video visit  Patient states stick was removed from the left side of her neck  Is slightly irritated red does not appear to have a rash  Is getting the tick evaluated for Lyme disease  She denies any other related symptoms  Patient does have dogs  Past Medical History:   Diagnosis Date   • Abnormal Pap smear of cervix    • Bilateral ovarian cysts    • Disease of thyroid gland        History reviewed  No pertinent surgical history      Current Outpatient Medications   Medication Sig Dispense Refill   • doxycycline hyclate (VIBRAMYCIN) 100 mg capsule Take 1 capsule (100 mg total) by mouth every 12 (twelve) hours for 1 day 2 capsule 0   • b complex vitamins tablet Take 1 tablet by mouth daily     • betamethasone valerate (VALISONE) 0 1 % cream Apply topically 2 (two) times a day 45 g 0   • Cholecalciferol (VITAMIN D-1000 MAX ST PO) Take by mouth     • Glucos-Chondroit-Hyaluron-MSM (GLUCOSAMINE CHONDROITIN JOINT PO) Take by mouth     • levothyroxine 75 mcg tablet TAKE ONE TABLET BY MOUTH EVERY DAY 90 tablet 0   • Loratadine (CLARITIN PO) Take by mouth     • LORazepam (ATIVAN) 1 mg tablet TAKE ONE TABLET BY MOUTH EVERY DAY 30 tablet 3   • methylPREDNISolone 4 MG tablet therapy pack Use as directed on package (Patient not taking: Reported on 6/12/2022) 21 each 0   • metroNIDAZOLE (METROGEL) 1 % gel Apply topically daily 45 g 2   • Saccharomyces boulardii (Probiotic) 250 MG CAPS Take by mouth     • Turmeric 500 MG CAPS Take by mouth     • vitamin E, tocopherol, 400 units capsule Take 400 Units by mouth daily       Current Facility-Administered Medications   Medication Dose Route Frequency Provider Last Rate Last Admin   • cyanocobalamin injection 1,000 mcg  1,000 mcg Intramuscular Q30 Days Mallory Black MD   1,000 mcg at 04/16/21 1025   • cyanocobalamin injection 1,000 mcg  1,000 mcg Intramuscular Q30 Days Mallory Black MD   1,000 mcg at 11/11/21 1731        Allergies   Allergen Reactions   • Aleve [Naproxen] Rash   • Penicillins Other (See Comments)     childhood       Review of Systems   Constitutional: Negative for appetite change and fever  HENT: Negative for sinus pressure and sore throat  Eyes: Negative for pain  Respiratory: Negative for shortness of breath  Cardiovascular: Negative for chest pain  Gastrointestinal: Negative for abdominal pain  Genitourinary: Negative for dysuria  Musculoskeletal: Negative for arthralgias and myalgias  Skin: Positive for rash and wound  Negative for color change  Neurological: Negative for light-headedness  Psychiatric/Behavioral: Negative for behavioral problems  Video Exam    There were no vitals filed for this visit  Physical Exam  Pulmonary:      Breath sounds: Normal breath sounds  Skin:     Findings: Lesion (Left side of neck  7-8 mm red raised area no signs of infection ) present  Neurological:      General: No focal deficit present  Mental Status: She is alert     Psychiatric:         Mood and Affect: Mood normal           I spent 7 minutes directly with the patient during this visit

## 2022-10-26 ENCOUNTER — TELEPHONE (OUTPATIENT)
Dept: FAMILY MEDICINE CLINIC | Facility: CLINIC | Age: 57
End: 2022-10-26

## 2022-10-26 NOTE — TELEPHONE ENCOUNTER
Pt  called, she said she had Tick tested at [facility] and it tested positive for Lymes, she is wondering if she need more antibiotics?

## 2022-10-27 ENCOUNTER — HOSPITAL ENCOUNTER (OUTPATIENT)
Dept: RADIOLOGY | Facility: MEDICAL CENTER | Age: 57
Discharge: HOME | End: 2022-10-27
Payer: COMMERCIAL

## 2022-10-27 VITALS — WEIGHT: 206 LBS | HEIGHT: 65 IN | BODY MASS INDEX: 34.32 KG/M2

## 2022-10-27 DIAGNOSIS — Z12.31 SCREENING MAMMOGRAM FOR HIGH-RISK PATIENT: ICD-10-CM

## 2022-10-27 PROCEDURE — 77063 BREAST TOMOSYNTHESIS BI: CPT

## 2022-10-27 PROCEDURE — 77067 SCR MAMMO BI INCL CAD: CPT

## 2022-11-11 LAB — B BURGDOR AB SER IA-ACNC: <0.9 INDEX

## 2022-11-17 ENCOUNTER — OFFICE VISIT (OUTPATIENT)
Dept: FAMILY MEDICINE CLINIC | Facility: CLINIC | Age: 57
End: 2022-11-17

## 2022-11-17 ENCOUNTER — HOSPITAL ENCOUNTER (OUTPATIENT)
Dept: RADIOLOGY | Facility: MEDICAL CENTER | Age: 57
Discharge: HOME | End: 2022-11-17

## 2022-11-17 VITALS
SYSTOLIC BLOOD PRESSURE: 100 MMHG | DIASTOLIC BLOOD PRESSURE: 70 MMHG | RESPIRATION RATE: 16 BRPM | HEART RATE: 62 BPM | OXYGEN SATURATION: 99 % | BODY MASS INDEX: 34.32 KG/M2 | WEIGHT: 206 LBS | TEMPERATURE: 97.3 F | HEIGHT: 65 IN

## 2022-11-17 DIAGNOSIS — E66.09 CLASS 1 OBESITY DUE TO EXCESS CALORIES WITHOUT SERIOUS COMORBIDITY WITH BODY MASS INDEX (BMI) OF 32.0 TO 32.9 IN ADULT: ICD-10-CM

## 2022-11-17 DIAGNOSIS — E03.4 HYPOTHYROIDISM DUE TO ACQUIRED ATROPHY OF THYROID: Primary | ICD-10-CM

## 2022-11-17 DIAGNOSIS — K21.9 GASTROESOPHAGEAL REFLUX DISEASE WITHOUT ESOPHAGITIS: ICD-10-CM

## 2022-11-17 DIAGNOSIS — D51.0 PERNICIOUS ANEMIA: ICD-10-CM

## 2022-11-17 DIAGNOSIS — E03.4 HYPOTHYROIDISM DUE TO ACQUIRED ATROPHY OF THYROID: ICD-10-CM

## 2022-11-17 DIAGNOSIS — L71.9 ROSACEA: ICD-10-CM

## 2022-11-17 DIAGNOSIS — F41.9 ANXIETY: ICD-10-CM

## 2022-11-17 DIAGNOSIS — E03.9 HYPOTHYROIDISM, UNSPECIFIED TYPE: ICD-10-CM

## 2022-11-17 RX ORDER — CYANOCOBALAMIN 1000 UG/ML
1000 INJECTION, SOLUTION INTRAMUSCULAR; SUBCUTANEOUS
Status: SHIPPED | OUTPATIENT
Start: 2022-11-17

## 2022-11-17 RX ORDER — ESOMEPRAZOLE MAGNESIUM 40 MG/1
40 CAPSULE, DELAYED RELEASE ORAL
Qty: 30 CAPSULE | Refills: 1 | Status: SHIPPED | OUTPATIENT
Start: 2022-11-17

## 2022-11-17 RX ORDER — LORAZEPAM 1 MG/1
1 TABLET ORAL DAILY
Qty: 30 TABLET | Refills: 3 | Status: SHIPPED | OUTPATIENT
Start: 2022-11-17

## 2022-11-17 RX ORDER — LEVOTHYROXINE SODIUM 0.07 MG/1
75 TABLET ORAL DAILY
Qty: 90 TABLET | Refills: 1 | Status: SHIPPED | OUTPATIENT
Start: 2022-11-17

## 2022-11-17 RX ADMIN — CYANOCOBALAMIN 1000 MCG: 1000 INJECTION, SOLUTION INTRAMUSCULAR; SUBCUTANEOUS at 14:06

## 2022-11-17 NOTE — PROGRESS NOTES
Name: Eunice Robles      : 1965      MRN: [id number]  Encounter Provider: Milady Boles MD  Encounter Date: 2022   Encounter department: [address] [place] [address] Place     1  Hypothyroidism due to acquired atrophy of thyroid  Assessment & Plan:  Patient to continue current dose of thyroid medicine and recheck TSH in 6 months    Orders:  -     TSH, 3rd generation with Free T4 reflex; Future    2  Rosacea  Assessment & Plan:  Patient is stable  and will continue present plan of care and reassess at next routine visit  All questions about this problem from patient were answered today  3  Anxiety  Assessment & Plan:  Patient to continue utilizing medical therapy as well as counseling sources as applicable to condition  If suicidal thought or fear of suicide attempt, to call 911 and seek help immediately  Medications and therapy reviewed today and all patient  questions answered today  Orders:  -     LORazepam (ATIVAN) 1 mg tablet; Take 1 tablet (1 mg total) by mouth daily    4  Class 1 obesity due to excess calories without serious comorbidity with body mass index (BMI) of 32 0 to 32 9 in adult  Assessment & Plan:  Patient to increase exercise and partake of a diet with less calories to promote  weight loss    Orders:  -     Ambulatory Referral to Weight Management; Future    5  Gastroesophageal reflux disease without esophagitis  -     esomeprazole (NexIUM) 40 MG capsule; Take 1 capsule (40 mg total) by mouth daily before breakfast    6  Hypothyroidism, unspecified type  Assessment & Plan:  Patient to continue current dose of thyroid medicine and recheck TSH in 6 months    Orders:  -     levothyroxine 75 mcg tablet; Take 1 tablet (75 mcg total) by mouth daily           Subjective     This is a 81-year-old female here today for checkup on multiple medical problems  Patient with some anxiety hypothyroidism    Patient is doing well with that however she is due for some lab work which will ordered for today patient also has a few other new problems she does have some problems with her left big toe she does have some tendinitis when she fully extends that toe discussed with her about using some anti-inflammatories as well as some warm soaks in Epsom salts to help with that  Patient also is having some classic reflux symptoms after eating at night she does have no real heartburn but she feels this dose food is in her lower esophagus and she will have some discomfort every in the evening after eating  Patient does not smoke does not consume alcohol to excess and we discussed about her decreasing her caffeine intake  Will see about starting her on a trial of Nexium to see if this goes away will bring her back in about 6 weeks if at that time is working will see about doing a drug holiday see if she still needs it and if she does not great if not she would need to have a GI evaluation  She needs refills on all of her medicines and that was done for her today  Patient also is inquiring about some weight loss education and will get her set up with the weight management people to the bariatric department  Review of Systems   Constitutional: Negative for activity change, appetite change, fatigue and fever  HENT: Negative for congestion, ear pain, postnasal drip, rhinorrhea, sinus pressure, sinus pain, sneezing and sore throat  Eyes: Negative for pain and redness  Respiratory: Negative for apnea, cough, chest tightness, shortness of breath and wheezing  Cardiovascular: Negative for chest pain, palpitations and leg swelling  Gastrointestinal: Positive for abdominal pain  Negative for constipation, diarrhea, nausea and vomiting  Endocrine: Negative for cold intolerance and heat intolerance  Genitourinary: Negative for difficulty urinating, dysuria, frequency, hematuria and urgency  Musculoskeletal: Negative for arthralgias, back pain, gait problem and myalgias  Skin: Negative for rash  Neurological: Negative for dizziness, speech difficulty, weakness, numbness and headaches  Hematological: Does not bruise/bleed easily  Psychiatric/Behavioral: Negative for agitation, confusion and hallucinations  Past Medical History:   Diagnosis Date   • Abnormal Pap smear of cervix    • Bilateral ovarian cysts    • Disease of thyroid gland      History reviewed  No pertinent surgical history  Family History   Problem Relation Age of Onset   • Thyroid disease Mother    • Hypertension Mother    • Heart disease Father    • Diabetes Father    • Hypertension Father    • Diabetes Sister    • Other Sister         car accident   • Heart disease Maternal Grandmother    • Diabetes Maternal Grandmother    • Cancer Maternal Grandfather    • No Known Problems Paternal Grandmother    • No Known Problems Paternal Grandfather    • Breast cancer Other    • No Known Problems Paternal Uncle      Social History     Socioeconomic History   • Marital status: /Civil Union     Spouse name: None   • Number of children: None   • Years of education: None   • Highest education level: None   Occupational History   • Occupation: Hairdressers, hairstylists, and cosmetologists    Tobacco Use   • Smoking status: Never   • Smokeless tobacco: Never   Vaping Use   • Vaping Use: Never used   Substance and Sexual Activity   • Alcohol use: Yes     Comment: occasional as per Susanna   • Drug use: Never   • Sexual activity: Yes     Partners: Male   Other Topics Concern   • None   Social History Narrative    · Do you currently or have you served in the [employer]:   No      · Were you activated, into active duty, as a member of the [email or web address] or as a Reservist:   No      · Diet:   Regular      · Caffeine intake: Moderate      · Seat belts used routinely:   Yes      · Sunscreen used routinely:   Yes      · Smoke alarm in home:    Yes      ·      Social Determinants of Health     Financial Resource Strain: Not on file   Food Insecurity: Not on file   Transportation Needs: Not on file   Physical Activity: Not on file   Stress: Not on file   Social Connections: Not on file   Intimate Partner Violence: Not on file   Housing Stability: Not on file     Current Outpatient Medications on File Prior to Visit   Medication Sig   • b complex vitamins tablet Take 1 tablet by mouth daily   • Cholecalciferol (VITAMIN D-1000 MAX ST PO) Take by mouth   • Glucos-Chondroit-Hyaluron-MSM (GLUCOSAMINE CHONDROITIN JOINT PO) Take by mouth   • Loratadine (CLARITIN PO) Take by mouth   • metroNIDAZOLE (METROGEL) 1 % gel Apply topically daily   • Saccharomyces boulardii (Probiotic) 250 MG CAPS Take by mouth   • Turmeric 500 MG CAPS Take by mouth   • vitamin E, tocopherol, 400 units capsule Take 400 Units by mouth daily   • [DISCONTINUED] levothyroxine 75 mcg tablet TAKE ONE TABLET BY MOUTH EVERY DAY   • [DISCONTINUED] LORazepam (ATIVAN) 1 mg tablet TAKE ONE TABLET BY MOUTH EVERY DAY   • betamethasone valerate (VALISONE) 0 1 % cream Apply topically 2 (two) times a day (Patient not taking: Reported on 11/17/2022)   • [DISCONTINUED] methylPREDNISolone 4 MG tablet therapy pack Use as directed on package (Patient not taking: Reported on 6/12/2022)     Allergies   Allergen Reactions   • Eggs Or Egg-Derived Products - Food Allergy Hives   • Aleve [Naproxen] Rash   • Penicillins Other (See Comments)     childhood     Immunization History   Administered Date(s) Administered   • Tdap 10/23/2015, 08/14/2020       Objective     /70 (BP Location: Left arm, Patient Position: Sitting, Cuff Size: Large)   Pulse 62   Temp (!) 97 3 °F (36 3 °C) (Temporal)   Resp 16   Ht 5' 5" (1 651 m)   Wt 93 4 kg (206 lb)   SpO2 99%   BMI 34 28 kg/m²     Physical Exam  Vitals and nursing note reviewed  Constitutional:       Appearance: She is well-developed and well-nourished  HENT:      Head: Normocephalic and atraumatic        Nose: Nose normal  Eyes:      General: No scleral icterus  Extraocular Movements: EOM normal       Conjunctiva/sclera: Conjunctivae normal       Pupils: Pupils are equal, round, and reactive to light  Neck:      Thyroid: No thyromegaly  Cardiovascular:      Rate and Rhythm: Normal rate  Pulmonary:      Effort: Pulmonary effort is normal       Breath sounds: Normal breath sounds  No wheezing  Abdominal:      General: Bowel sounds are normal  There is no distension  Palpations: Abdomen is soft  Tenderness: There is no abdominal tenderness  There is no guarding or rebound  Musculoskeletal:         General: No tenderness, deformity or edema  Normal range of motion  Cervical back: Normal range of motion and neck supple  Skin:     General: Skin is warm and dry  Findings: No erythema or rash  Neurological:      Mental Status: She is alert and oriented to person, place, and time  Sensory: No sensory deficit  Psychiatric:         Mood and Affect: Mood and affect normal          Behavior: Behavior normal          Thought Content:  Thought content normal          Judgment: Judgment normal        Rehana Johnson MD

## 2023-01-21 DIAGNOSIS — K21.9 GASTROESOPHAGEAL REFLUX DISEASE WITHOUT ESOPHAGITIS: ICD-10-CM

## 2023-01-21 RX ORDER — ESOMEPRAZOLE MAGNESIUM 40 MG/1
CAPSULE, DELAYED RELEASE ORAL
Qty: 30 CAPSULE | Refills: 1 | Status: SHIPPED | OUTPATIENT
Start: 2023-01-21

## 2023-03-02 ENCOUNTER — OFFICE VISIT (OUTPATIENT)
Dept: FAMILY MEDICINE CLINIC | Facility: CLINIC | Age: 58
End: 2023-03-02

## 2023-03-02 VITALS
TEMPERATURE: 98.1 F | OXYGEN SATURATION: 99 % | WEIGHT: 213 LBS | SYSTOLIC BLOOD PRESSURE: 104 MMHG | BODY MASS INDEX: 35.49 KG/M2 | DIASTOLIC BLOOD PRESSURE: 62 MMHG | HEIGHT: 65 IN | HEART RATE: 79 BPM

## 2023-03-02 DIAGNOSIS — D51.0 PERNICIOUS ANEMIA: ICD-10-CM

## 2023-03-02 DIAGNOSIS — R53.83 OTHER FATIGUE: ICD-10-CM

## 2023-03-02 DIAGNOSIS — Z13.220 SCREENING CHOLESTEROL LEVEL: ICD-10-CM

## 2023-03-02 DIAGNOSIS — E66.09 CLASS 1 OBESITY DUE TO EXCESS CALORIES WITHOUT SERIOUS COMORBIDITY WITH BODY MASS INDEX (BMI) OF 32.0 TO 32.9 IN ADULT: ICD-10-CM

## 2023-03-02 DIAGNOSIS — F41.9 ANXIETY: ICD-10-CM

## 2023-03-02 DIAGNOSIS — Z12.11 SCREEN FOR COLON CANCER: ICD-10-CM

## 2023-03-02 DIAGNOSIS — E03.4 HYPOTHYROIDISM DUE TO ACQUIRED ATROPHY OF THYROID: ICD-10-CM

## 2023-03-02 DIAGNOSIS — L71.9 ROSACEA: Primary | ICD-10-CM

## 2023-03-02 RX ORDER — CYANOCOBALAMIN 1000 UG/ML
1000 INJECTION, SOLUTION INTRAMUSCULAR; SUBCUTANEOUS
Status: SHIPPED | OUTPATIENT
Start: 2023-03-02

## 2023-03-02 RX ADMIN — CYANOCOBALAMIN 1000 MCG: 1000 INJECTION, SOLUTION INTRAMUSCULAR; SUBCUTANEOUS at 15:03

## 2023-03-02 NOTE — PROGRESS NOTES
Name: Nader Noyola      : 1965      MRN: [id number]  Encounter Provider: Hema Whitney MD  Encounter Date: 3/2/2023   Encounter department: [address] [place] [address] Place     1  [facility]  Assessment & Plan:  Patient is stable  and will continue present plan of care and reassess at next routine visit  All questions about this problem from patient were answered today  2  Hypothyroidism due to acquired atrophy of thyroid  Assessment & Plan:  Patient to continue current dose of thyroid medicine and recheck TSH in 6 months    Orders:  -     TSH, 3rd generation with Free T4 reflex; Future    3  Class 1 obesity due to excess calories without serious comorbidity with body mass index (BMI) of 32 0 to 32 9 in adult  Assessment & Plan:  Patient to increase exercise and partake of a diet with less calories to promote  weight loss      4  Anxiety  Assessment & Plan:  Patient to continue utilizing medical therapy as well as counseling sources as applicable to condition  If suicidal thought or fear of suicide attempt, to call 911 and seek help immediately  Medications and therapy reviewed today and all patient  questions answered today  5  Pernicious anemia  -     cyanocobalamin injection 1,000 mcg    6  Screen for colon cancer  -     Cologuard    7  Other fatigue  -     Comprehensive metabolic panel; Future  -     CBC and differential; Future  -     Magnesium; Future  -     Uric acid; Future  -     Urinalysis with microscopic    8  Screening cholesterol level  -     Lipid Panel with Direct LDL reflex; Future      BMI Counseling: There is no height or weight on file to calculate BMI   The BMI is above normal  Nutrition recommendations include decreasing portion sizes, encouraging healthy choices of fruits and vegetables, decreasing fast food intake, consuming healthier snacks, limiting drinks that contain sugar, moderation in carbohydrate intake, increasing intake of lean protein, reducing intake of saturated and trans fat and reducing intake of cholesterol  Exercise recommendations include exercising 3-5 times per week  No pharmacotherapy was ordered  Patient referred to PCP  Rationale for BMI follow-up plan is due to patient being overweight or obese  Subjective     23-year-old female here today for checkup on hypothyroidism BMI of 35 as well as GERD  Patient states that her GERD symptoms have gotten a lot better since on the Nexium and working to see about doing a drug holiday  If she is still symptom-free she will stay off the Nexium and she will carry on as she normally does but otherwise if she has resumption of symptoms she will go back on her Nexium and we will get her to see one of the GI specialist for further evaluation of her reflux and her any other etiology of this  Patient also is scheduled to see the people at the bariatric center for counseling for weight loss  Patient also would like a B12 shot today  When she has refills on her medicine she will call I have also to order some lab work for her thyroid which she is due for and she get that at any time  Review of Systems   Constitutional: Negative for activity change, appetite change, fatigue and fever  HENT: Negative for congestion, ear pain, postnasal drip, rhinorrhea, sinus pressure, sinus pain, sneezing and sore throat  Eyes: Negative for pain and redness  Respiratory: Negative for apnea, cough, chest tightness, shortness of breath and wheezing  Cardiovascular: Negative for chest pain, palpitations and leg swelling  Gastrointestinal: Negative for abdominal pain, constipation, diarrhea, nausea and vomiting  Endocrine: Negative for cold intolerance and heat intolerance  Genitourinary: Negative for difficulty urinating, dysuria, frequency, hematuria and urgency  Musculoskeletal: Negative for arthralgias, back pain, gait problem and myalgias  Skin: Negative for rash     Neurological: Negative for dizziness, speech difficulty, weakness, numbness and headaches  Hematological: Does not bruise/bleed easily  Psychiatric/Behavioral: Negative for agitation, confusion and hallucinations  Past Medical History:   Diagnosis Date   • Abnormal Pap smear of cervix    • Bilateral ovarian cysts    • Disease of thyroid gland      History reviewed  No pertinent surgical history  Family History   Problem Relation Age of Onset   • Thyroid disease Mother    • Hypertension Mother    • Heart disease Father    • Diabetes Father    • Hypertension Father    • Diabetes Sister    • Other Sister         car accident   • Heart disease Maternal Grandmother    • Diabetes Maternal Grandmother    • Cancer Maternal Grandfather    • No Known Problems Paternal Grandmother    • No Known Problems Paternal Grandfather    • Breast cancer Other    • No Known Problems Paternal Uncle      Social History     Socioeconomic History   • Marital status: /Civil Union     Spouse name: None   • Number of children: None   • Years of education: None   • Highest education level: None   Occupational History   • Occupation: Hairdressers, hairstylists, and cosmetologists    Tobacco Use   • Smoking status: Never   • Smokeless tobacco: Never   Vaping Use   • Vaping Use: Never used   Substance and Sexual Activity   • Alcohol use: Yes     Comment: occasional as per Leopold   • Drug use: Never   • Sexual activity: Yes     Partners: Male   Other Topics Concern   • None   Social History Narrative    · Do you currently or have you served in the [employer]:   No      · Were you activated, into active duty, as a member of the [employer] or as a Reservist:   No      · Diet:   Regular      · Caffeine intake: Moderate      · Seat belts used routinely:   Yes      · Sunscreen used routinely:   Yes      · Smoke alarm in home:    Yes      ·      Social Determinants of Health     Financial Resource Strain: Not on file   Food Insecurity: Not on file   Transportation Needs: Not on file   Physical Activity: Not on file   Stress: Not on file   Social Connections: Not on file   Intimate Partner Violence: Not on file   Housing Stability: Not on file     Current Outpatient Medications on File Prior to Visit   Medication Sig   • b complex vitamins tablet Take 1 tablet by mouth daily   • Cholecalciferol (VITAMIN D-1000 MAX ST PO) Take by mouth   • esomeprazole (NexIUM) 40 MG capsule TAKE ONE CAPSULE BY MOUTH EVERY DAY BEFORE BREAKFAST   • Glucos-Chondroit-Hyaluron-MSM (GLUCOSAMINE CHONDROITIN JOINT PO) Take by mouth   • levothyroxine 75 mcg tablet Take 1 tablet (75 mcg total) by mouth daily   • Loratadine (CLARITIN PO) Take by mouth   • LORazepam (ATIVAN) 1 mg tablet Take 1 tablet (1 mg total) by mouth daily   • metroNIDAZOLE (METROGEL) 1 % gel Apply topically daily   • Saccharomyces boulardii (Probiotic) 250 MG CAPS Take by mouth   • Turmeric 500 MG CAPS Take by mouth   • vitamin E, tocopherol, 400 units capsule Take 400 Units by mouth daily   • betamethasone valerate (VALISONE) 0 1 % cream Apply topically 2 (two) times a day (Patient not taking: Reported on 11/17/2022)     Allergies   Allergen Reactions   • Eggs Or Egg-Derived Products - Food Allergy Hives   • Aleve [Naproxen] Rash   • Penicillins Other (See Comments)     childhood     Immunization History   Administered Date(s) Administered   • Tdap 10/23/2015, 08/14/2020       Objective     /62 (BP Location: Left arm, Patient Position: Sitting, Cuff Size: Large)   Pulse 79   Temp 98 1 °F (36 7 °C)   Ht 5' 5" (1 651 m)   Wt 96 6 kg (213 lb)   SpO2 99%   BMI 35 45 kg/m²     Physical Exam  Vitals and nursing note reviewed  Constitutional:       Appearance: She is well-developed  HENT:      Head: Normocephalic and atraumatic  Nose: Nose normal       Mouth/Throat:      Mouth: Mucous membranes are moist    Eyes:      General: No scleral icterus       Conjunctiva/sclera: Conjunctivae normal  Pupils: Pupils are equal, round, and reactive to light  Neck:      Thyroid: No thyromegaly  Cardiovascular:      Rate and Rhythm: Normal rate and regular rhythm  Pulmonary:      Effort: Pulmonary effort is normal       Breath sounds: Normal breath sounds  No wheezing  Abdominal:      General: Bowel sounds are normal  There is no distension  Palpations: Abdomen is soft  Tenderness: There is no abdominal tenderness  There is no guarding or rebound  Musculoskeletal:         General: No tenderness or deformity  Normal range of motion  Cervical back: Normal range of motion and neck supple  Skin:     General: Skin is warm and dry  Findings: No erythema or rash  Neurological:      Mental Status: She is alert and oriented to person, place, and time  Sensory: No sensory deficit  Psychiatric:         Mood and Affect: Mood normal          Behavior: Behavior normal          Thought Content:  Thought content normal          Judgment: Judgment normal        Ysabel Mayorga MD

## 2023-03-23 ENCOUNTER — CONSULT (OUTPATIENT)
Dept: BARIATRICS | Facility: CLINIC | Age: 58
End: 2023-03-23

## 2023-03-23 VITALS
DIASTOLIC BLOOD PRESSURE: 78 MMHG | HEART RATE: 80 BPM | WEIGHT: 212 LBS | SYSTOLIC BLOOD PRESSURE: 120 MMHG | HEIGHT: 63 IN | RESPIRATION RATE: 14 BRPM | TEMPERATURE: 98.6 F | BODY MASS INDEX: 37.56 KG/M2

## 2023-03-23 DIAGNOSIS — E66.09 CLASS 1 OBESITY DUE TO EXCESS CALORIES WITHOUT SERIOUS COMORBIDITY WITH BODY MASS INDEX (BMI) OF 32.0 TO 32.9 IN ADULT: ICD-10-CM

## 2023-03-23 DIAGNOSIS — E03.4 HYPOTHYROIDISM DUE TO ACQUIRED ATROPHY OF THYROID: Primary | ICD-10-CM

## 2023-03-23 DIAGNOSIS — E55.9 VITAMIN D DEFICIENCY: ICD-10-CM

## 2023-03-23 DIAGNOSIS — K21.9 GASTROESOPHAGEAL REFLUX DISEASE, UNSPECIFIED WHETHER ESOPHAGITIS PRESENT: ICD-10-CM

## 2023-03-23 DIAGNOSIS — F41.9 ANXIETY: ICD-10-CM

## 2023-03-23 NOTE — PROGRESS NOTES
Assessment/Plan:  Jaz Christian was seen today for consult  Diagnoses and all orders for this visit:    Hypothyroidism due to acquired atrophy of thyroid  Lab Results   Component Value Date    TSH 2 46 06/20/2022     So far controlled needs repeat TSH  Class 1 obesity due to excess calories without serious comorbidity with body mass index (BMI) of 32 0 to 32 9 in adult  -     Ambulatory Referral to Weight Management  -     Ambulatory referral to Sleep Medicine; Future  -     Insulin, fasting; Future  Declines the  use of AOM   Anxiety  controlled  Gastroesophageal reflux disease, unspecified whether esophagitis present  active weight loss will help   Vitamin D deficiency  -     Vitamin D 25 hydroxy; Future         Obesity:   Weight not at goal and patient tried more than 6 months to lose weight and was not able to achieve a meaningful weight loss above 5%  - Discussed options of [facility]-Intensive Lifestyle Intervention Program, Very Low Calorie Diet-VLCD, Conservative Program, Ketan-En-Y Gastric Bypass and Vertical Sleeve Gastrectomy and the role of weight loss medications  - Patient is interested in pursuing Conservative Program  - Initial weight loss goal of 5-10% weight loss for improved health  - Weight loss can improve patient's co-morbid conditions and/or prevent weight-related complications  Medications prescribed: refused  Meet dietician: agrees  Meet behavioral specialist: agrees  • Calorie goal handouts provided:  1300kcal   • Motivational interview performed and patient noted changes to work on until next visit  • Hydration: 64oz fluid, no sugary drinks  • Goal 3 meals per day  • Food log encouraged , phone jericho or paper journal  • Increase physical activity by 10 minutes daily  Patient denies personal and family history of  pancreatitis, thyroid cancer, MEN-2 tumors  Denies uncontrolled anxiety or depression, suicidal behavior or thinking , insomnia or sleep disturbance     US thyroid 11/2022  IMPRESSION:     Heterogeneous in echotexture thyroid gland without suspicious discrete nodule       Total time spent: 45 min, with >50% face-to-face time spent counseling patient on nonsurgical interventions for the treatment of excess weight  Discussed in detail nonsurgical options including intensive lifestyle intervention program, very low-calorie diet program and conservative program   Discussed the role of weight loss medications  Counseled patient on diet behavior and exercise modification for weight loss  Return in     Subjective:   Chief Complaint   Patient presents with   • Consult     Initial visit with Cecil MOSELEY 4/8  Patient ID: Pb Crews  is a 65 y o  female with excess weight/obesity here to pursue weight management  Previous notes and records have been reviewed  HPI  Wt Readings from Last 20 Encounters:   03/23/23 96 2 kg (212 lb)   03/02/23 96 6 kg (213 lb)   11/17/22 93 4 kg (206 lb)   10/27/22 93 4 kg (206 lb)   06/12/22 93 4 kg (206 lb)   05/12/22 93 4 kg (206 lb)   04/11/22 93 4 kg (206 lb)   11/11/21 89 8 kg (198 lb)   08/19/21 88 5 kg (195 lb)   05/20/21 88 5 kg (195 lb)   04/16/21 88 5 kg (195 lb)   03/05/21 88 5 kg (195 lb)   02/26/21 88 9 kg (196 lb)   02/05/21 89 4 kg (197 lb)   08/14/20 87 1 kg (192 lb)   11/16/19 83 9 kg (185 lb)     Obesity/Excess Weight:Body mass index is 37 55 kg/m²      Severity: severe  Onset:  childhood  Modifiers:  Weight Watchers, [place] system   Contributing factors: Poor Food Choices, Insufficient Physical Activity and Stress/Emotional Eating  Associated symptoms: comorbid conditions    B-eggs or toast protein pancakes  L- peanut butter jelly or salad at work or nutrisytem bar or kind and salad  D-7 pm hello fresh or meat potato or rice   Snacks:craving sweets  Hydration:water and ice tea   Alcohol: rarely  Smoking: no  Exercise: not much but get about 24744 steps per day  Occupation:    Sleep:   STOP "ban/8    Past Medical History:   Diagnosis Date   • Abnormal Pap smear of cervix    • Bilateral ovarian cysts    • Disease of thyroid gland      History reviewed  No pertinent surgical history  The following portions of the patient's history were reviewed and updated as appropriate: allergies, current medications, past family history, past medical history, past social history, past surgical history, and problem list     ROS:  Review of Systems   Constitutional: Negative for activity change  Fatigue  HENT: Negative for trouble swallowing  Respiratory: Negative for shortness of breath  Cardiovascular: Negative for chest pain, edema  Gastrointestinal: Negative for abdominal pain, nausea and vomiting, acid reflux, constipation/diarrhea  Endocrine: negative for heat /cold intolerance  Genitourinary: Negative for difficulty urinating  Musculoskeletal: Negative for gait problem and myalgias  Psychiatric/Behavioral: Negative for behavioral problems including anxiety /depression  Objective:  /78 (BP Location: Left arm, Patient Position: Sitting, Cuff Size: Large)   Pulse 80   Temp 98 6 °F (37 °C)   Resp 14   Ht 5' 3\" (1 6 m)   Wt 96 2 kg (212 lb)   BMI 37 55 kg/m²   Constitutional: Well-developed, well-nourished and Obese Body mass index is 37 55 kg/m²  Levonia Saltness Alert, cooperative  HEENT: No conjunctival injection  No thyroid masses  Pulmonary: No increased work of breathing or signs of respiratory distress  Clear respiratory sounds  CV: Well-perfused, Regular rate and rhythm, no murmurs  Vascular: no peripheral edema  GI: Abdomen obese, Non-distended  Not tender  MSK: no sarcopenia noted   Neuro: Oriented to person, place and time  Normal Speech  Normal gait  Psych: Normal affect and mood  Normal thought process, no delusions     Labs and Imaging  Recent labs and imaging have been personally reviewed    Lab Results   Component Value Date    WBC 5 0 2022    HGB 13 9 2022    HCT 42 1 2022 " MCV 92 9 06/20/2022     06/20/2022     Lab Results   Component Value Date    SODIUM 139 06/20/2022    K 4 2 06/20/2022     06/20/2022    CO2 26 06/20/2022    BUN 23 06/20/2022    CREATININE 0 85 06/20/2022    GLUC 88 06/20/2022    CALCIUM 9 2 06/20/2022    AST 19 06/20/2022    ALT 21 06/20/2022    ALKPHOS 46 06/20/2022    TP 6 8 06/20/2022    TBILI 0 7 06/20/2022     No results found for: HGBA1C  Lab Results   Component Value Date    TSH 2 46 06/20/2022     Lab Results   Component Value Date    CHOLESTEROL 153 06/20/2022     Lab Results   Component Value Date    HDL 38 (L) 06/20/2022     Lab Results   Component Value Date    TRIG 109 06/20/2022     Lab Results   Component Value Date    LDLCALC 94 06/20/2022

## 2023-03-24 DIAGNOSIS — F41.9 ANXIETY: ICD-10-CM

## 2023-03-25 RX ORDER — LORAZEPAM 1 MG/1
TABLET ORAL
Qty: 30 TABLET | Refills: 3 | Status: SHIPPED | OUTPATIENT
Start: 2023-03-25

## 2023-03-27 LAB
ALBUMIN SERPL-MCNC: 4.1 G/DL (ref 3.6–5.1)
ALBUMIN/GLOB SERPL: 1.8 (CALC) (ref 1–2.5)
ALP SERPL-CCNC: 47 U/L (ref 37–153)
ALT SERPL-CCNC: 34 U/L (ref 6–29)
AST SERPL-CCNC: 23 U/L (ref 10–35)
BASOPHILS # BLD AUTO: 62 CELLS/UL (ref 0–200)
BASOPHILS NFR BLD AUTO: 1.3 %
BILIRUB SERPL-MCNC: 0.8 MG/DL (ref 0.2–1.2)
BUN SERPL-MCNC: 16 MG/DL (ref 7–25)
BUN/CREAT SERPL: ABNORMAL (CALC) (ref 6–22)
CALCIUM SERPL-MCNC: 9.4 MG/DL (ref 8.6–10.4)
CHLORIDE SERPL-SCNC: 105 MMOL/L (ref 98–110)
CHOLEST SERPL-MCNC: 147 MG/DL
CHOLEST/HDLC SERPL: 4.5 (CALC)
CO2 SERPL-SCNC: 26 MMOL/L (ref 20–32)
CREAT SERPL-MCNC: 0.81 MG/DL (ref 0.5–1.03)
EOSINOPHIL # BLD AUTO: 322 CELLS/UL (ref 15–500)
EOSINOPHIL NFR BLD AUTO: 6.7 %
ERYTHROCYTE [DISTWIDTH] IN BLOOD BY AUTOMATED COUNT: 11.6 % (ref 11–15)
GFR/BSA.PRED SERPLBLD CYS-BASED-ARV: 85 ML/MIN/1.73M2
GLOBULIN SER CALC-MCNC: 2.3 G/DL (CALC) (ref 1.9–3.7)
GLUCOSE SERPL-MCNC: 96 MG/DL (ref 65–99)
HCT VFR BLD AUTO: 41.1 % (ref 35–45)
HDLC SERPL-MCNC: 33 MG/DL
HGB BLD-MCNC: 13.6 G/DL (ref 11.7–15.5)
LDLC SERPL CALC-MCNC: 95 MG/DL (CALC)
LYMPHOCYTES # BLD AUTO: 1454 CELLS/UL (ref 850–3900)
LYMPHOCYTES NFR BLD AUTO: 30.3 %
MAGNESIUM SERPL-MCNC: 2 MG/DL (ref 1.5–2.5)
MCH RBC QN AUTO: 30 PG (ref 27–33)
MCHC RBC AUTO-ENTMCNC: 33.1 G/DL (ref 32–36)
MCV RBC AUTO: 90.7 FL (ref 80–100)
MONOCYTES # BLD AUTO: 475 CELLS/UL (ref 200–950)
MONOCYTES NFR BLD AUTO: 9.9 %
NEUTROPHILS # BLD AUTO: 2486 CELLS/UL (ref 1500–7800)
NEUTROPHILS NFR BLD AUTO: 51.8 %
NONHDLC SERPL-MCNC: 114 MG/DL (CALC)
PLATELET # BLD AUTO: 229 THOUSAND/UL (ref 140–400)
PMV BLD REES-ECKER: 10.1 FL (ref 7.5–12.5)
POTASSIUM SERPL-SCNC: 4.2 MMOL/L (ref 3.5–5.3)
PROT SERPL-MCNC: 6.4 G/DL (ref 6.1–8.1)
RBC # BLD AUTO: 4.53 MILLION/UL (ref 3.8–5.1)
SODIUM SERPL-SCNC: 138 MMOL/L (ref 135–146)
TRIGL SERPL-MCNC: 92 MG/DL
TSH SERPL-ACNC: 1.37 MIU/L (ref 0.4–4.5)
URATE SERPL-MCNC: 6.2 MG/DL (ref 2.5–7)
WBC # BLD AUTO: 4.8 THOUSAND/UL (ref 3.8–10.8)

## 2023-03-28 DIAGNOSIS — K21.9 GASTROESOPHAGEAL REFLUX DISEASE WITHOUT ESOPHAGITIS: ICD-10-CM

## 2023-03-28 RX ORDER — ESOMEPRAZOLE MAGNESIUM 40 MG/1
CAPSULE, DELAYED RELEASE ORAL
Qty: 30 CAPSULE | Refills: 1 | Status: SHIPPED | OUTPATIENT
Start: 2023-03-28

## 2023-07-19 DIAGNOSIS — F41.9 ANXIETY: ICD-10-CM

## 2023-07-19 DIAGNOSIS — E03.9 HYPOTHYROIDISM, UNSPECIFIED TYPE: ICD-10-CM

## 2023-07-19 RX ORDER — LEVOTHYROXINE SODIUM 0.07 MG/1
TABLET ORAL
Qty: 90 TABLET | Refills: 1 | Status: SHIPPED | OUTPATIENT
Start: 2023-07-19

## 2023-07-19 RX ORDER — LORAZEPAM 1 MG/1
TABLET ORAL
Qty: 30 TABLET | Refills: 3 | Status: SHIPPED | OUTPATIENT
Start: 2023-07-19

## 2023-08-03 ENCOUNTER — OFFICE VISIT (OUTPATIENT)
Dept: FAMILY MEDICINE CLINIC | Facility: CLINIC | Age: 58
End: 2023-08-03
Payer: COMMERCIAL

## 2023-08-03 VITALS
SYSTOLIC BLOOD PRESSURE: 100 MMHG | WEIGHT: 210 LBS | DIASTOLIC BLOOD PRESSURE: 70 MMHG | HEIGHT: 63 IN | TEMPERATURE: 98.2 F | BODY MASS INDEX: 37.21 KG/M2 | RESPIRATION RATE: 18 BRPM | OXYGEN SATURATION: 98 % | HEART RATE: 68 BPM

## 2023-08-03 DIAGNOSIS — R05.8 OTHER COUGH: ICD-10-CM

## 2023-08-03 DIAGNOSIS — J01.00 SUBACUTE MAXILLARY SINUSITIS: Primary | ICD-10-CM

## 2023-08-03 PROCEDURE — 99214 OFFICE O/P EST MOD 30 MIN: CPT | Performed by: NURSE PRACTITIONER

## 2023-08-03 RX ORDER — CIPROFLOXACIN 500 MG/1
500 TABLET, FILM COATED ORAL EVERY 12 HOURS SCHEDULED
Qty: 14 TABLET | Refills: 0 | Status: SHIPPED | OUTPATIENT
Start: 2023-08-03 | End: 2023-08-10

## 2023-08-03 RX ORDER — BENZONATATE 100 MG/1
100 CAPSULE ORAL 3 TIMES DAILY PRN
Qty: 60 CAPSULE | Refills: 0 | Status: SHIPPED | OUTPATIENT
Start: 2023-08-03

## 2023-08-03 NOTE — PROGRESS NOTES
Name: Hernando Ervin      : 1965      MRN: [id number]  Encounter Provider: VIET Schwab  Encounter Date: 8/3/2023   Encounter department: [address] [place] [address]     1. Subacute maxillary sinusitis  -     benzonatate (TESSALON PERLES) 100 mg capsule; Take 1 capsule (100 mg total) by mouth 3 (three) times a day as needed for cough  -     ciprofloxacin (CIPRO) 500 mg tablet; Take 1 tablet (500 mg total) by mouth every 12 (twelve) hours for 7 days    2. Other cough  -     benzonatate (TESSALON PERLES) 100 mg capsule; Take 1 capsule (100 mg total) by mouth 3 (three) times a day as needed for cough  -     ciprofloxacin (CIPRO) 500 mg tablet; Take 1 tablet (500 mg total) by mouth every 12 (twelve) hours for 7 days    Physical assessment inconclusive based upon patient's subjective complaints will treat for subacute sinusitis I sent a Cipro to the pharmacy to be taken twice daily for 7 days also sending bentonite to the pharmacy for cough. Patient is advised if she does develop a yeast infection she can call the office Monday and will send some fluconazole to be taken once. All other questions were answered return to office as needed or as indicated. Dosing all possible side effects of the prescribed medications or medications that had been prescribed in the past were reviewed and all questions were answered. Patient verbalized agreement and understanding of the plan of care as outlined during the office visit today return to office as indicated or sooner if a problem arises. Subjective        Patient is here with a complaint of upper respiratory tract discomfort has had a cough runny nose and some nasal congestion. Denies any need nausea vomiting diarrhea chest pains or shortness of breath. All over-the-counter medication attempted arm were unsuccessful. Review of Systems   Constitutional: Negative for chills.    HENT: Positive for congestion, rhinorrhea, sinus pressure and sinus pain. Negative for sore throat. Respiratory: Negative for cough. Current Outpatient Medications on File Prior to Visit   Medication Sig   • levothyroxine 75 mcg tablet TAKE ONE TABLET BY MOUTH EVERY DAY   • Loratadine (CLARITIN PO) Take by mouth   • LORazepam (ATIVAN) 1 mg tablet TAKE ONE TABLET BY MOUTH EVERY DAY   • metroNIDAZOLE (METROGEL) 1 % gel Apply topically daily   • b complex vitamins tablet Take 1 tablet by mouth daily (Patient not taking: Reported on 8/3/2023)   • betamethasone valerate (VALISONE) 0.1 % cream Apply topically 2 (two) times a day (Patient not taking: Reported on 8/3/2023)   • Cholecalciferol (VITAMIN D-1000 MAX ST PO) Take by mouth (Patient not taking: Reported on 8/3/2023)   • esomeprazole (NexIUM) 40 MG capsule TAKE ONE CAPSULE BY MOUTH EVERY DAY BEFORE BREAKFAST (Patient not taking: Reported on 8/3/2023)   • Glucos-Chondroit-Hyaluron-MSM (GLUCOSAMINE CHONDROITIN JOINT PO) Take by mouth (Patient not taking: Reported on 8/3/2023)   • Saccharomyces boulardii (Probiotic) 250 MG CAPS Take by mouth (Patient not taking: Reported on 8/3/2023)   • Turmeric 500 MG CAPS Take by mouth (Patient not taking: Reported on 8/3/2023)   • vitamin E, tocopherol, 400 units capsule Take 400 Units by mouth daily (Patient not taking: Reported on 8/3/2023)       Objective     /70 (BP Location: Left arm, Patient Position: Sitting, Cuff Size: Large)   Pulse 68   Temp 98.2 °F (36.8 °C) (Temporal)   Resp 18   Ht 5' 3" (1.6 m)   Wt 95.3 kg (210 lb)   SpO2 98%   BMI 37.20 kg/m²     Physical Exam  Constitutional:       General: She is not in acute distress. Appearance: She is not diaphoretic. HENT:      Head: Atraumatic. Nose: Mucosal edema, congestion and rhinorrhea present. Right Sinus: Frontal sinus tenderness present. Left Sinus: Frontal sinus tenderness present. Cardiovascular:      Rate and Rhythm: Normal rate and regular rhythm. Heart sounds: Normal heart sounds. Pulmonary:      Effort: Pulmonary effort is normal.      Breath sounds: Normal breath sounds. Musculoskeletal:         General: Normal range of motion. Cervical back: Normal range of motion.        VIET Schwab

## 2023-09-06 NOTE — PROGRESS NOTES
Name: Destiny Fritz      : 1965      MRN: [id number]  Encounter Provider: Sheliah Primrose, MD  Encounter Date: 2023   Encounter department: [facility] [address]     1. Well adult exam    2. Gastroesophageal reflux disease, unspecified whether esophagitis present  Assessment & Plan:  Patient to continue with present therapy and decrease caffeine, avoid ETOH and smoking to decrease acid production. Pt should also cease eating prior to bedtime and avoid excessive fluid intake prior to sleep. May use antacids as needed for breakthrough GERD. All pateint questions answered today about this condition. 3. Anxiety  Assessment & Plan:  Patient to continue utilizing medical therapy as well as counseling sources as applicable to condition. If suicidal thought or fear of suicide attempt, to call 911 and seek help immediately. Medications and therapy reviewed today and all patient  questions answered today. 4. Class 1 obesity due to excess calories without serious comorbidity with body mass index (BMI) of 32.0 to 32.9 in adult  Assessment & Plan:  Patient to increase exercise and partake of a diet with less calories to promote  weight loss      5. Hypothyroidism due to acquired atrophy of thyroid  Assessment & Plan:  Patient to continue current dose of thyroid medicine and recheck TSH in 6 months    Orders:  -     TSH, 3rd generation with Free T4 reflex; Future    6. Rosacea  Assessment & Plan:  Patient is stable  and will continue present plan of care and reassess at next routine visit. All questions about this problem from patient were answered today. 7. Hypothyroidism, unspecified type  Assessment & Plan:  Patient to continue current dose of thyroid medicine and recheck TSH in 6 months      8. Screening mammogram for high-risk patient  -     Mammo screening bilateral w 3d & cad; Future; Expected date: 2023    9.  Chronic pain of both knees  -     XR knee 3 vw right non injury; Future; Expected date: 09/07/2023  -     XR knee 3 vw left non injury; Future; Expected date: 09/07/2023    10. Bilateral hip pain  -     XR hip/pelv 2-3 vws right if performed; Future; Expected date: 09/07/2023  -     XR hip/pelv 2-3 vws left if performed; Future; Expected date: 09/07/2023    11. Acute bilateral low back pain without sciatica  -     XR spine lumbar minimum 4 views non injury; Future; Expected date: 09/07/2023           Subjective     Is a 81-year-old female here for yearly physical exam.  Patient with a history of some GERD and anxiety as well as hypothyroidism and a BMI of 37. Patient has stopped her Nexium because she is not eating anymore and she is doing very well we did review her lab work her thyroid is well controlled and we will refill her prescription for thyroid medicine when she needs it. She does take some Ativan for anxiety and does not need any refills at this point and she will also call when she needs that 1. I have ordered her thyroid lab work for next visit in 6 months. Review of Systems    Past Medical History:   Diagnosis Date   • Abnormal Pap smear of cervix    • Bilateral ovarian cysts    • Disease of thyroid gland      History reviewed. No pertinent surgical history.   Family History   Problem Relation Age of Onset   • Thyroid disease Mother    • Hypertension Mother    • Heart disease Father    • Diabetes Father    • Hypertension Father    • Diabetes Sister    • Other Sister         car accident   • Heart disease Maternal Grandmother    • Diabetes Maternal Grandmother    • Cancer Maternal Grandfather    • No Known Problems Paternal Grandmother    • No Known Problems Paternal Grandfather    • Breast cancer Other    • No Known Problems Paternal Uncle      Social History     Socioeconomic History   • Marital status: /Civil Union     Spouse name: None   • Number of children: None   • Years of education: None   • Highest education level: None   Occupational History   • Occupation: [employer] and [employer]    Tobacco Use   • Smoking status: Never   • Smokeless tobacco: Never   Vaping Use   • Vaping Use: Never used   Substance and Sexual Activity   • Alcohol use: Yes     Comment: occasional as per Susanna   • Drug use: Never   • Sexual activity: Yes     Partners: Male   Other Topics Concern   • None   Social History Narrative    · Do you currently or have you served in the [address] [place] [address] [employer]:   No      · Were you activated, into active duty, as a member of the [employer] or as a Reservist:   No      · Diet:   Regular      · Caffeine intake: Moderate      · Seat belts used routinely:   Yes      · Sunscreen used routinely:   Yes      · Smoke alarm in home:    Yes      ·      Social Determinants of Health     Financial Resource Strain: Not on file   Food Insecurity: Not on file   Transportation Needs: Not on file   Physical Activity: Not on file   Stress: Not on file   Social Connections: Not on file   Intimate Partner Violence: Not on file   Housing Stability: Not on file     Current Outpatient Medications on File Prior to Visit   Medication Sig   • levothyroxine 75 mcg tablet TAKE ONE TABLET BY MOUTH EVERY DAY   • Loratadine (CLARITIN PO) Take by mouth   • LORazepam (ATIVAN) 1 mg tablet TAKE ONE TABLET BY MOUTH EVERY DAY   • metroNIDAZOLE (METROGEL) 1 % gel Apply topically daily   • betamethasone valerate (VALISONE) 0.1 % cream Apply topically 2 (two) times a day (Patient not taking: Reported on 8/3/2023)   • [DISCONTINUED] b complex vitamins tablet Take 1 tablet by mouth daily (Patient not taking: Reported on 8/3/2023)   • [DISCONTINUED] benzonatate (TESSALON PERLES) 100 mg capsule Take 1 capsule (100 mg total) by mouth 3 (three) times a day as needed for cough (Patient not taking: Reported on 9/7/2023)   • [DISCONTINUED] Cholecalciferol (VITAMIN D-1000 MAX ST PO) Take by mouth (Patient not taking: Reported on 8/3/2023)   • [DISCONTINUED] esomeprazole (NexIUM) 40 MG capsule TAKE ONE CAPSULE BY MOUTH EVERY DAY BEFORE BREAKFAST (Patient not taking: Reported on 8/3/2023)   • [DISCONTINUED] Glucos-Chondroit-Hyaluron-MSM (GLUCOSAMINE CHONDROITIN JOINT PO) Take by mouth (Patient not taking: Reported on 8/3/2023)   • [DISCONTINUED] Saccharomyces boulardii (Probiotic) 250 MG CAPS Take by mouth (Patient not taking: Reported on 8/3/2023)   • [DISCONTINUED] Turmeric 500 MG CAPS Take by mouth (Patient not taking: Reported on 8/3/2023)   • [DISCONTINUED] vitamin E, tocopherol, 400 units capsule Take 400 Units by mouth daily (Patient not taking: Reported on 8/3/2023)     Allergies   Allergen Reactions   • Eggs Or Egg-Derived Products - Food Allergy Hives   • Aleve [Naproxen] Rash   • Penicillins Other (See Comments)     childhood     Immunization History   Administered Date(s) Administered   • Tdap 10/23/2015, 08/14/2020       Objective     /72 (BP Location: Left arm, Patient Position: Sitting, Cuff Size: Large)   Pulse 75   Temp 98.4 °F (36.9 °C)   Resp 18   Ht 5' 3" (1.6 m)   Wt 96.8 kg (213 lb 6.4 oz)   SpO2 98%   BMI 37.80 kg/m²     Physical Exam  Zeinab Clancy MD

## 2023-09-07 ENCOUNTER — OFFICE VISIT (OUTPATIENT)
Dept: FAMILY MEDICINE CLINIC | Facility: CLINIC | Age: 58
End: 2023-09-07
Payer: COMMERCIAL

## 2023-09-07 VITALS
OXYGEN SATURATION: 98 % | BODY MASS INDEX: 37.81 KG/M2 | TEMPERATURE: 98.4 F | RESPIRATION RATE: 18 BRPM | HEIGHT: 63 IN | DIASTOLIC BLOOD PRESSURE: 72 MMHG | WEIGHT: 213.4 LBS | SYSTOLIC BLOOD PRESSURE: 130 MMHG | HEART RATE: 75 BPM

## 2023-09-07 DIAGNOSIS — G89.29 CHRONIC PAIN OF BOTH KNEES: ICD-10-CM

## 2023-09-07 DIAGNOSIS — M25.561 CHRONIC PAIN OF BOTH KNEES: ICD-10-CM

## 2023-09-07 DIAGNOSIS — M54.50 ACUTE BILATERAL LOW BACK PAIN WITHOUT SCIATICA: ICD-10-CM

## 2023-09-07 DIAGNOSIS — Z00.00 WELL ADULT EXAM: Primary | ICD-10-CM

## 2023-09-07 DIAGNOSIS — M25.551 BILATERAL HIP PAIN: ICD-10-CM

## 2023-09-07 DIAGNOSIS — Z78.0 MENOPAUSE: ICD-10-CM

## 2023-09-07 DIAGNOSIS — E03.4 HYPOTHYROIDISM DUE TO ACQUIRED ATROPHY OF THYROID: ICD-10-CM

## 2023-09-07 DIAGNOSIS — M25.552 BILATERAL HIP PAIN: ICD-10-CM

## 2023-09-07 DIAGNOSIS — E03.9 HYPOTHYROIDISM, UNSPECIFIED TYPE: ICD-10-CM

## 2023-09-07 DIAGNOSIS — Z12.31 SCREENING MAMMOGRAM FOR HIGH-RISK PATIENT: ICD-10-CM

## 2023-09-07 DIAGNOSIS — F41.9 ANXIETY: ICD-10-CM

## 2023-09-07 DIAGNOSIS — E66.09 CLASS 1 OBESITY DUE TO EXCESS CALORIES WITHOUT SERIOUS COMORBIDITY WITH BODY MASS INDEX (BMI) OF 32.0 TO 32.9 IN ADULT: ICD-10-CM

## 2023-09-07 DIAGNOSIS — K21.9 GASTROESOPHAGEAL REFLUX DISEASE, UNSPECIFIED WHETHER ESOPHAGITIS PRESENT: ICD-10-CM

## 2023-09-07 DIAGNOSIS — L71.9 ROSACEA: ICD-10-CM

## 2023-09-07 DIAGNOSIS — M25.562 CHRONIC PAIN OF BOTH KNEES: ICD-10-CM

## 2023-09-07 PROCEDURE — 99396 PREV VISIT EST AGE 40-64: CPT | Performed by: FAMILY MEDICINE

## 2023-09-11 ENCOUNTER — APPOINTMENT (OUTPATIENT)
Dept: RADIOLOGY | Facility: MEDICAL CENTER | Age: 58
End: 2023-09-11
Payer: COMMERCIAL

## 2023-09-11 DIAGNOSIS — M25.561 CHRONIC PAIN OF BOTH KNEES: ICD-10-CM

## 2023-09-11 DIAGNOSIS — G89.29 CHRONIC PAIN OF BOTH KNEES: ICD-10-CM

## 2023-09-11 DIAGNOSIS — M25.562 CHRONIC PAIN OF BOTH KNEES: ICD-10-CM

## 2023-09-11 DIAGNOSIS — M25.551 BILATERAL HIP PAIN: ICD-10-CM

## 2023-09-11 DIAGNOSIS — M25.552 BILATERAL HIP PAIN: ICD-10-CM

## 2023-09-11 DIAGNOSIS — M54.50 ACUTE BILATERAL LOW BACK PAIN WITHOUT SCIATICA: ICD-10-CM

## 2023-09-11 PROCEDURE — 73562 X-RAY EXAM OF KNEE 3: CPT

## 2023-09-11 PROCEDURE — 73523 X-RAY EXAM HIPS BI 5/> VIEWS: CPT

## 2023-09-11 PROCEDURE — 72110 X-RAY EXAM L-2 SPINE 4/>VWS: CPT

## 2023-09-18 DIAGNOSIS — M25.50 ARTHRALGIA, UNSPECIFIED JOINT: Primary | ICD-10-CM

## 2023-09-18 RX ORDER — CELECOXIB 200 MG/1
200 CAPSULE ORAL DAILY
Qty: 30 CAPSULE | Refills: 5 | Status: SHIPPED | OUTPATIENT
Start: 2023-09-18

## 2023-10-03 ENCOUNTER — TELEPHONE (OUTPATIENT)
Age: 58
End: 2023-10-03

## 2023-10-03 DIAGNOSIS — Z12.11 SCREEN FOR COLON CANCER: Primary | ICD-10-CM

## 2023-10-03 NOTE — TELEPHONE ENCOUNTER
Pt. Saintclair Gu she was going to do Cologuard but her's has  and she would like to get a new one. Also, she wants a copy of her images that were taken of her knees, spine and hip. If they can be put on her MyChart that would be fine, otherwise she would like a copy to . Pt. Would like a return call on this.   Ty

## 2023-10-03 NOTE — TELEPHONE ENCOUNTER
Patient will need new rx for cologard, her is . Call patient when placed. Spoke with patient and she will contact medical records for copies of her images.

## 2023-11-13 DIAGNOSIS — F41.9 ANXIETY: ICD-10-CM

## 2023-11-14 RX ORDER — LORAZEPAM 1 MG/1
TABLET ORAL
Qty: 30 TABLET | Refills: 3 | Status: SHIPPED | OUTPATIENT
Start: 2023-11-14

## 2024-01-11 DIAGNOSIS — E03.9 HYPOTHYROIDISM, UNSPECIFIED TYPE: ICD-10-CM

## 2024-01-12 RX ORDER — LEVOTHYROXINE SODIUM 0.07 MG/1
TABLET ORAL
Qty: 90 TABLET | Refills: 1 | Status: SHIPPED | OUTPATIENT
Start: 2024-01-12

## 2024-03-10 DIAGNOSIS — F41.9 ANXIETY: ICD-10-CM

## 2024-03-11 RX ORDER — LORAZEPAM 1 MG/1
TABLET ORAL
Qty: 30 TABLET | Refills: 3 | Status: SHIPPED | OUTPATIENT
Start: 2024-03-11

## 2024-06-20 ENCOUNTER — OFFICE VISIT (OUTPATIENT)
Dept: FAMILY MEDICINE CLINIC | Facility: CLINIC | Age: 59
End: 2024-06-20
Payer: COMMERCIAL

## 2024-06-20 VITALS
WEIGHT: 216 LBS | RESPIRATION RATE: 18 BRPM | OXYGEN SATURATION: 99 % | BODY MASS INDEX: 38.27 KG/M2 | HEIGHT: 63 IN | TEMPERATURE: 98.4 F | HEART RATE: 77 BPM | DIASTOLIC BLOOD PRESSURE: 70 MMHG | SYSTOLIC BLOOD PRESSURE: 120 MMHG

## 2024-06-20 DIAGNOSIS — K21.9 GASTROESOPHAGEAL REFLUX DISEASE, UNSPECIFIED WHETHER ESOPHAGITIS PRESENT: ICD-10-CM

## 2024-06-20 DIAGNOSIS — Z13.220 SCREENING CHOLESTEROL LEVEL: ICD-10-CM

## 2024-06-20 DIAGNOSIS — R53.83 OTHER FATIGUE: ICD-10-CM

## 2024-06-20 DIAGNOSIS — E66.09 CLASS 1 OBESITY DUE TO EXCESS CALORIES WITHOUT SERIOUS COMORBIDITY WITH BODY MASS INDEX (BMI) OF 32.0 TO 32.9 IN ADULT: Primary | ICD-10-CM

## 2024-06-20 DIAGNOSIS — E03.9 HYPOTHYROIDISM, UNSPECIFIED TYPE: ICD-10-CM

## 2024-06-20 DIAGNOSIS — L71.9 ROSACEA: ICD-10-CM

## 2024-06-20 DIAGNOSIS — F41.9 ANXIETY: ICD-10-CM

## 2024-06-20 DIAGNOSIS — R30.0 DYSURIA: ICD-10-CM

## 2024-06-20 DIAGNOSIS — E03.4 HYPOTHYROIDISM DUE TO ACQUIRED ATROPHY OF THYROID: ICD-10-CM

## 2024-06-20 DIAGNOSIS — Z78.0 MENOPAUSE: ICD-10-CM

## 2024-06-20 DIAGNOSIS — Z12.11 SCREEN FOR COLON CANCER: ICD-10-CM

## 2024-06-20 DIAGNOSIS — Z12.31 SCREENING MAMMOGRAM FOR HIGH-RISK PATIENT: ICD-10-CM

## 2024-06-20 LAB
BACTERIA UR QL AUTO: NORMAL /HPF
BILIRUB UR QL STRIP: NEGATIVE
CLARITY UR: CLEAR
COLOR UR: NORMAL
GLUCOSE UR STRIP-MCNC: NEGATIVE MG/DL
HGB UR QL STRIP.AUTO: NEGATIVE
KETONES UR STRIP-MCNC: NEGATIVE MG/DL
LEUKOCYTE ESTERASE UR QL STRIP: NEGATIVE
NITRITE UR QL STRIP: NEGATIVE
NON-SQ EPI CELLS URNS QL MICRO: NORMAL /HPF
PH UR STRIP.AUTO: 6.5 [PH]
PROT UR STRIP-MCNC: NEGATIVE MG/DL
RBC #/AREA URNS AUTO: NORMAL /HPF
SP GR UR STRIP.AUTO: 1.01 (ref 1–1.03)
UROBILINOGEN UR STRIP-ACNC: <2 MG/DL
WBC #/AREA URNS AUTO: NORMAL /HPF

## 2024-06-20 PROCEDURE — 81003 URINALYSIS AUTO W/O SCOPE: CPT | Performed by: FAMILY MEDICINE

## 2024-06-20 PROCEDURE — 99214 OFFICE O/P EST MOD 30 MIN: CPT | Performed by: FAMILY MEDICINE

## 2024-06-20 PROCEDURE — 81001 URINALYSIS AUTO W/SCOPE: CPT | Performed by: FAMILY MEDICINE

## 2024-06-20 RX ORDER — LEVOTHYROXINE SODIUM 0.07 MG/1
75 TABLET ORAL DAILY
Qty: 90 TABLET | Refills: 1 | Status: SHIPPED | OUTPATIENT
Start: 2024-06-20

## 2024-06-20 RX ORDER — LORAZEPAM 1 MG/1
1 TABLET ORAL DAILY
Qty: 30 TABLET | Refills: 3 | Status: SHIPPED | OUTPATIENT
Start: 2024-06-20

## 2024-06-20 NOTE — PROGRESS NOTES
Ambulatory Visit  Name: Nisha Worrell      : 1965      MRN: [id number]  Encounter Provider: Jordy Evans MD  Encounter Date: 2024   Encounter department: [facility]    Assessment & Plan   1. Class 1 obesity due to excess calories without serious comorbidity with body mass index (BMI) of 32.0 to 32.9 in adult  Assessment & Plan:  Patient to increase exercise and partake of a diet with less calories to promote  weight loss   2. Anxiety  Assessment & Plan:  Patient to continue utilizing medical therapy as well as counseling sources as applicable to condition. If suicidal thought or fear of suicide attempt, to call 911 and seek help immediately. Medications and therapy reviewed today and all patient  questions answered today.   Orders:  -     LORazepam (ATIVAN) 1 mg tablet; Take 1 tablet (1 mg total) by mouth daily  3. Gastroesophageal reflux disease, unspecified whether esophagitis present  Assessment & Plan:  Patient to continue with present therapy and decrease caffeine, avoid ETOH and smoking to decrease acid production. Pt should also cease eating prior to bedtime and avoid excessive fluid intake prior to sleep. May use antacids as needed for breakthrough GERD. All pateint questions answered today about this condition.   Orders:  -     TSH, 3rd generation with Free T4 reflex; Future  4. Hypothyroidism due to acquired atrophy of thyroid  Assessment & Plan:  Patient to continue current dose of thyroid medicine and recheck TSH in 6 months   5. Rosacea  Assessment & Plan:  Patient is stable  and will continue present plan of care and reassess at next routine visit. All questions about this problem from patient were answered today.   6. Screen for colon cancer  -     Cologuard  7. Dysuria  -     UA w Reflex to Microscopic w Reflex to Culture; Future  8. Menopause  -     DXA bone density spine hip and pelvis; Future; Expected date: 2024  9. Screening mammogram for  high-risk patient  -     Mammo screening bilateral w 3d & cad; Future; Expected date: 06/20/2024  10. Hypothyroidism, unspecified type  Assessment & Plan:  Patient to continue current dose of thyroid medicine and recheck TSH in 6 months   Orders:  -     levothyroxine 75 mcg tablet; Take 1 tablet (75 mcg total) by mouth daily  11. Other fatigue  -     Comprehensive metabolic panel; Future  -     CBC and differential; Future  -     Magnesium; Future  -     Uric acid; Future  -     UA/M w/rflx Culture, Comp  12. Screening cholesterol level  -     Lipid Panel with Direct LDL reflex; Future      Depression Screening and Follow-up Plan: Patient was screened for depression during today's encounter. They screened negative with a PHQ-2 score of 0.        History of Present Illness     8-year-old female today for checkup on multimedical process patient with a history of BMI of 38 also history of anxiety hypothyroidism and rosacea.  Patient also with some dysuria and would give us a urine specimen today to assess her for any kind of possible UTI.  Patient also is due for mammogram and DEXA scan.  Patient had lab work done at [facility] for her thyroid which was done back in March we will track down that information but she had results on her phone and it was normal we will refill her thyroid medicine as well as her anxiety medicine today.  Also discussed with her about getting a Cologuard for screening for colon cancer which we will order for her.  Her medicines were refilled refilled today and we will see her back in approximately 6 months.      Review of Systems   Constitutional:  Negative for activity change, appetite change, fatigue and fever.   HENT:  Negative for congestion, ear pain, postnasal drip, rhinorrhea, sinus pressure, sinus pain, sneezing and sore throat.    Eyes:  Negative for pain and redness.   Respiratory:  Negative for apnea, cough, chest tightness, shortness of breath and wheezing.    Cardiovascular:  Negative  for chest pain, palpitations and leg swelling.   Gastrointestinal:  Negative for abdominal pain, constipation, diarrhea, nausea and vomiting.   Endocrine: Negative for cold intolerance and heat intolerance.   Genitourinary:  Negative for difficulty urinating, dysuria, frequency, hematuria and urgency.   Musculoskeletal:  Negative for arthralgias, back pain, gait problem and myalgias.   Skin:  Negative for rash.   Neurological:  Negative for dizziness, speech difficulty, weakness, numbness and headaches.   Hematological:  Does not bruise/bleed easily.   Psychiatric/Behavioral:  Negative for agitation, confusion and hallucinations.      Past Medical History:   Diagnosis Date   • Abnormal Pap smear of cervix    • Bilateral ovarian cysts    • Disease of thyroid gland      History reviewed. No pertinent surgical history.  Family History   Problem Relation Age of Onset   • Thyroid disease Mother    • Hypertension Mother    • Heart disease Father    • Diabetes Father    • Hypertension Father    • Diabetes Sister    • Other Sister         car accident   • Heart disease Maternal Grandmother    • Diabetes Maternal Grandmother    • Cancer Maternal Grandfather    • No Known Problems Paternal Grandmother    • No Known Problems Paternal Grandfather    • Breast cancer Other    • No Known Problems Paternal Uncle      Social History     Tobacco Use   • Smoking status: Never     Passive exposure: Never   • Smokeless tobacco: Never   Vaping Use   • Vaping status: Never Used   Substance and Sexual Activity   • Alcohol use: Yes     Comment: occasional as per Ssuanna   • Drug use: Never   • Sexual activity: Yes     Partners: Male     Current Outpatient Medications on File Prior to Visit   Medication Sig   • Loratadine (CLARITIN PO) Take by mouth   • metroNIDAZOLE (METROGEL) 1 % gel Apply topically daily   • [DISCONTINUED] levothyroxine 75 mcg tablet TAKE ONE TABLET BY MOUTH EVERY DAY   • [DISCONTINUED] LORazepam (ATIVAN) 1 mg tablet TAKE  "ONE TABLET BY MOUTH EVERY DAY   • betamethasone valerate (VALISONE) 0.1 % cream Apply topically 2 (two) times a day (Patient not taking: Reported on 8/3/2023)   • [DISCONTINUED] celecoxib (CeleBREX) 200 mg capsule Take 1 capsule (200 mg total) by mouth daily (Patient not taking: Reported on 6/20/2024)     Allergies   Allergen Reactions   • Eggs Or Egg-Derived Products - Food Allergy Hives   • Aleve [Naproxen] Rash   • Penicillins Other (See Comments)     childhood     Immunization History   Administered Date(s) Administered   • Tdap 10/23/2015, 08/14/2020     Objective     /70 (BP Location: Left arm, Patient Position: Sitting, Cuff Size: Large)   Pulse 77   Temp 98.4 °F (36.9 °C) (Temporal)   Resp 18   Ht 5' 3\" (1.6 m)   Wt 98 kg (216 lb)   SpO2 99%   BMI 38.26 kg/m²     Physical Exam  Constitutional:       Appearance: Normal appearance. She is not ill-appearing.   HENT:      Head: Normocephalic and atraumatic.      Right Ear: Tympanic membrane normal.      Left Ear: Tympanic membrane normal.      Nose: Nose normal.      Mouth/Throat:      Mouth: Mucous membranes are moist.   Eyes:      Extraocular Movements: Extraocular movements intact.      Conjunctiva/sclera: Conjunctivae normal.      Pupils: Pupils are equal, round, and reactive to light.   Cardiovascular:      Rate and Rhythm: Normal rate and regular rhythm.   Pulmonary:      Effort: Pulmonary effort is normal. No respiratory distress.      Breath sounds: Normal breath sounds. No wheezing.   Abdominal:      General: Bowel sounds are normal.      Palpations: Abdomen is soft.      Tenderness: There is no abdominal tenderness.   Musculoskeletal:         General: No tenderness. Normal range of motion.      Cervical back: Normal range of motion and neck supple.      Right lower leg: No edema.      Left lower leg: No edema.   Skin:     General: Skin is warm and dry.   Neurological:      Mental Status: She is alert and oriented to person, place, and " time.   Psychiatric:         Mood and Affect: Mood normal.         Behavior: Behavior normal.         Thought Content: Thought content normal.         Judgment: Judgment normal.       Administrative Statements

## 2024-08-08 NOTE — ASSESSMENT & PLAN NOTE
Patient is stable  and will continue present plan of care and reassess at next routine visit  All questions about this problem from patient were answered today  Detail Level: Detailed Depth Of Biopsy: dermis Was A Bandage Applied: Yes Size Of Lesion In Cm: 0 Biopsy Type: H and E Biopsy Method: Dermablade Anesthesia Type: 1% lidocaine with epinephrine Anesthesia Volume In Cc: 0.5 Hemostasis: Drysol Wound Care: Petrolatum Dressing: bandage Destruction After The Procedure: No Type Of Destruction Used: Curettage Curettage Text: The wound bed was treated with curettage after the biopsy was performed. Cryotherapy Text: The wound bed was treated with cryotherapy after the biopsy was performed. Electrodesiccation Text: The wound bed was treated with electrodesiccation after the biopsy was performed. Electrodesiccation And Curettage Text: The wound bed was treated with electrodesiccation and curettage after the biopsy was performed. Silver Nitrate Text: The wound bed was treated with silver nitrate after the biopsy was performed. Lab: 6 Lab Facility: 3 Consent: VERBAL consent was obtained and risks were reviewed including but not limited to scarring, infection, bleeding, scabbing, incomplete removal, nerve damage and allergy to anesthesia. Post-Care Instructions: I reviewed with the patient in detail post-care instructions. Patient is to keep the biopsy site dry overnight, and then apply bacitracin twice daily until healed. Patient may apply hydrogen peroxide soaks to remove any crusting. Notification Instructions: Patient will be notified of biopsy results. However, patient instructed to call the office if not contacted within 2 weeks. Billing Type: Third-Party Bill Information: Selecting Yes will display possible errors in your note based on the variables you have selected. This validation is only offered as a suggestion for you. PLEASE NOTE THAT THE VALIDATION TEXT WILL BE REMOVED WHEN YOU FINALIZE YOUR NOTE. IF YOU WANT TO FAX A PRELIMINARY NOTE YOU WILL NEED TO TOGGLE THIS TO 'NO' IF YOU DO NOT WANT IT IN YOUR FAXED NOTE.

## 2024-08-26 ENCOUNTER — TELEPHONE (OUTPATIENT)
Age: 59
End: 2024-08-26

## 2024-08-26 DIAGNOSIS — E03.4 HYPOTHYROIDISM DUE TO ACQUIRED ATROPHY OF THYROID: ICD-10-CM

## 2024-08-26 DIAGNOSIS — E55.9 VITAMIN D DEFICIENCY: Primary | ICD-10-CM

## 2024-08-26 NOTE — TELEPHONE ENCOUNTER
Patient asked if doctor would add Vitamin D and thyroid antibody (if not already ordered) to her labs. The endocrinologist see will see in October requires labs to be within a 3 month window of the visit.

## 2024-08-30 ENCOUNTER — TELEPHONE (OUTPATIENT)
Age: 59
End: 2024-08-30

## 2024-08-30 NOTE — TELEPHONE ENCOUNTER
Dr. Ward calling, she states she is referrinf this patient to endo for hypothyroidism and possible Hashimotos. She was asking what labs need to be done. Informed her TSH, free T4 and thyroid antibodies panel have to be done within 3 months to schedule. She expressed understanding and will order these for the patient.  No further action needed.

## 2024-09-10 ENCOUNTER — TELEPHONE (OUTPATIENT)
Dept: OTHER | Facility: OTHER | Age: 59
End: 2024-09-10

## 2024-09-10 NOTE — TELEPHONE ENCOUNTER
Patient called, to know if there is a sooner appointment available to be seen. If not, patient would like to be placed on cancellation list. Patient also wanted to inform that lab work was completed.

## 2024-09-18 ENCOUNTER — TELEPHONE (OUTPATIENT)
Age: 59
End: 2024-09-18

## 2024-09-18 NOTE — TELEPHONE ENCOUNTER
Pt calling for NP appt for hair loss    Advised next avail, recommended pt call back for cancellations/new providers

## 2024-10-01 DIAGNOSIS — F41.9 ANXIETY: ICD-10-CM

## 2024-10-01 RX ORDER — LORAZEPAM 1 MG/1
1 TABLET ORAL DAILY
Qty: 30 TABLET | Refills: 3 | Status: SHIPPED | OUTPATIENT
Start: 2024-10-01

## 2024-10-22 NOTE — PROGRESS NOTES
" Nisha Worrell 59 y.o. female MRN: [id number]    Encounter: [id number]      Assessment & Plan     Assessment:  This is a 59 y.o.-year-old female with longstanding Hashimoto's hypothyroidism, presenting in consult for fatigue, positive antibodies.    Plan:   1. Hypothyroidism due to Hashimoto thyroiditis  Assessment & Plan:  Chronic, stable on levothyroxine 75mcg   Not etiology of patient's fatigue   Patient was taking \"standard process\" thyroid tropin supplement which does appear to contain processed bovine T3 extract only, stopped a few times because they made fatigue worse   Discouraged patient from resuming these  Patient also concerned about levels of her TPO antibodies, discussed that not helpful to track, and should not be related to whole body inflammation   2. Fatigue, unspecified type  Assessment & Plan:  Likely multifactorial  Worse after busy, physical workdays, along with joint pain, usually knees hips low back, sometimes upper body if trying to spare lower body by sitting  Patient does sleep well but does use Ativan to help sleep.  Has changed diet around Mediterranean, working on anti-inflammatory change, cutting out processed food, sweets.  Will screen for common causes including vitamin D deficiency, iron deficiency, B12  Orders:  -     Vitamin D 25 hydroxy; Future  -     TIBC Panel (incl. Iron, TIBC, % Iron Saturation); Future  -     Vitamin B12; Future  -     Magnesium; Future  3. B12 deficiency  Assessment & Plan:  Previously treated with B12 injections, levels normalized, not checked for several years  Orders:  -     Vitamin B12; Future  4. Leg cramp  Assessment & Plan:  At night, did improve after stopping \" standard process supplements\"  Patient may consider a magnesium oxide supplement before bedtime, otherwise pcp fu  Orders:  -     Magnesium; Future    FU PRN     CC: hypothyroid     History of Present Illness     HPI:  59 y.o. female with past medical history significant for class I " obesity BMI 38, anxiety, GERD, hypothyroidism, presenting in consult for thyroid.    Patient reports thyroid issues since age 26  On levothyroxine 75mcg for years   Trying mediterranean diet   Menopause early 50s  Was exercising in pool, stopped due to fatigue    9/3/2024 TSH 1.8, T4 9.3, T3 uptake 27%, free thyroxine index 2.5 , TPO pos   6/20/2024 TSH 1.36, free T4 1.1, free T3 3.2, TGAb neg, TPO positive, TRAb neg  2/2024 tpo  pos trab neg ft4 1.1 t3 3.2 tg neg  6/2022 TSH 2.46, free T41.2  11/2020 TSH 1.5, free T41.2  On levo 75 since at least 2019 11/17/2022 thyroid ultrasound with mildly heterogenous parenchyma, no discrete nodules    Mom dad sister MGM hypothyroid   No history of thyroid cancer in family  No unusual radiation exposure to patient  Works as hairdresser, own salon, only works physically part-time      Review of Systems   Constitutional:  Positive for fatigue (ryan afternoon, aroubnd 3pm). Negative for activity change, appetite change and unexpected weight change (Has lost some weight with dietary changes).   Musculoskeletal:  Positive for arthralgias (mostly hip and knee).   Skin:  Negative for rash.       Historical Information   Past Medical History:   Diagnosis Date    Abnormal Pap smear of cervix     Bilateral ovarian cysts     Disease of thyroid gland      History reviewed. No pertinent surgical history.  Social History   Social History     Substance and Sexual Activity   Alcohol Use Yes    Comment: occasional as per Perry     Social History     Substance and Sexual Activity   Drug Use Never     Social History     Tobacco Use   Smoking Status Never    Passive exposure: Never   Smokeless Tobacco Never     Family History:   Family History   Problem Relation Age of Onset    Thyroid disease Mother     Hypertension Mother     Heart disease Father     Diabetes Father     Hypertension Father     Diabetes Sister     Other Sister         car accident    Heart disease Maternal Grandmother      "Diabetes Maternal Grandmother     Cancer Maternal Grandfather     No Known Problems Paternal Grandmother     No Known Problems Paternal Grandfather     Breast cancer Other     No Known Problems Paternal Uncle        Meds/Allergies   Current Outpatient Medications   Medication Sig Dispense Refill    clobetasol propionate (CLOBEX) 0.05 % lotion 1 Application daily at bedtime      levothyroxine 75 mcg tablet Take 1 tablet (75 mcg total) by mouth daily 90 tablet 1    Loratadine (CLARITIN PO) Take by mouth      LORazepam (ATIVAN) 1 mg tablet TAKE ONE TABLET BY MOUTH EVERY DAY 30 tablet 3    metroNIDAZOLE (METROGEL) 1 % gel Apply topically daily 45 g 2     No current facility-administered medications for this visit.     Allergies   Allergen Reactions    Eggs Or Egg-Derived Products - Food Allergy Hives    Aleve [Naproxen] Rash    Penicillins Other (See Comments)     childhood       Objective   Vitals: Blood pressure 126/84, pulse 90, height 5' 3\" (1.6 m), weight 96.2 kg (212 lb), SpO2 99%.    Physical Exam  Constitutional:       General: She is not in acute distress.     Appearance: Normal appearance. She is obese. She is not ill-appearing, toxic-appearing or diaphoretic.   HENT:      Head: Normocephalic and atraumatic.      Nose: Nose normal.   Eyes:      Extraocular Movements: Extraocular movements intact.      Conjunctiva/sclera: Conjunctivae normal.      Pupils: Pupils are equal, round, and reactive to light.   Neck:      Thyroid: No thyroid mass, thyromegaly or thyroid tenderness.   Pulmonary:      Effort: Pulmonary effort is normal. No respiratory distress.   Abdominal:      General: There is no distension.   Musculoskeletal:         General: No deformity.      Right lower leg: No edema.      Left lower leg: No edema.   Lymphadenopathy:      Cervical: No cervical adenopathy.   Skin:     General: Skin is warm and dry.   Neurological:      General: No focal deficit present.      Mental Status: She is alert. Mental " "status is at baseline.   Psychiatric:         Mood and Affect: Mood normal.         Behavior: Behavior normal.         Thought Content: Thought content normal.         The history was obtained from the review of the chart, patient.    Lab Results:        Imaging Studies:   Results for orders placed during the hospital encounter of 11/17/22    US thyroid    Impression  Heterogeneous in echotexture thyroid gland without suspicious discrete nodule.    Reference: ACR Thyroid Imaging, Reporting and Data System (TI-RADS): White Paper of the ACR TI-RADS Committee. J AM Carlo Radiol 2017;14:587-595. (Additional recommendations based on American Thyroid Association 2015 guidelines.)      Workstation performed: [id number]      Results Review Statement: No pertinent imaging studies reviewed.    Portions of the record may have been created with voice recognition software. Occasional wrong word or \"sound a like\" substitutions may have occurred due to the inherent limitations of voice recognition software. Read the chart carefully and recognize, using context, where substitutions have occurred.    "

## 2024-10-24 ENCOUNTER — OFFICE VISIT (OUTPATIENT)
Dept: ENDOCRINOLOGY | Facility: CLINIC | Age: 59
End: 2024-10-24
Payer: COMMERCIAL

## 2024-10-24 VITALS
DIASTOLIC BLOOD PRESSURE: 84 MMHG | BODY MASS INDEX: 37.56 KG/M2 | HEIGHT: 63 IN | WEIGHT: 212 LBS | SYSTOLIC BLOOD PRESSURE: 126 MMHG | OXYGEN SATURATION: 99 % | HEART RATE: 90 BPM

## 2024-10-24 DIAGNOSIS — R25.2 LEG CRAMP: ICD-10-CM

## 2024-10-24 DIAGNOSIS — R53.83 FATIGUE, UNSPECIFIED TYPE: ICD-10-CM

## 2024-10-24 DIAGNOSIS — E06.3 HYPOTHYROIDISM DUE TO HASHIMOTO THYROIDITIS: Primary | ICD-10-CM

## 2024-10-24 DIAGNOSIS — E53.8 B12 DEFICIENCY: ICD-10-CM

## 2024-10-24 PROCEDURE — 99204 OFFICE O/P NEW MOD 45 MIN: CPT | Performed by: STUDENT IN AN ORGANIZED HEALTH CARE EDUCATION/TRAINING PROGRAM

## 2024-10-24 RX ORDER — CLOBETASOL PROPIONATE 0.5 MG/ML
1 LOTION TOPICAL
COMMUNITY
Start: 2024-09-25

## 2024-10-24 NOTE — ASSESSMENT & PLAN NOTE
"Chronic, stable on levothyroxine 75mcg   Not etiology of patient's fatigue   Patient was taking \"standard process\" thyroid tropin supplement which does appear to contain processed bovine T3 extract only, stopped a few times because they made fatigue worse   Discouraged patient from resuming these  Patient also concerned about levels of her TPO antibodies, discussed that not helpful to track, and should not be related to whole body inflammation   "

## 2024-10-24 NOTE — ASSESSMENT & PLAN NOTE
Likely multifactorial  Worse after busy, physical workdays, along with joint pain, usually knees hips low back, sometimes upper body if trying to spare lower body by sitting  Patient does sleep well but does use Ativan to help sleep.  Has changed diet around Mediterranean, working on anti-inflammatory change, cutting out processed food, sweets.  Will screen for common causes including vitamin D deficiency, iron deficiency, B12

## 2024-10-24 NOTE — ASSESSMENT & PLAN NOTE
"At night, did improve after stopping \" standard process supplements\"  Patient may consider a magnesium oxide supplement before bedtime, otherwise pcp fu  "

## 2024-10-31 ENCOUNTER — TELEPHONE (OUTPATIENT)
Age: 59
End: 2024-10-31

## 2024-10-31 DIAGNOSIS — M25.50 MULTIPLE JOINT PAIN: Primary | ICD-10-CM

## 2024-10-31 NOTE — TELEPHONE ENCOUNTER
Patient had gone to Endo last Thurs - they suggested she go to Rheum - she mentioned this before to Dr. Evans but he would like a referral to Dr. Martha Ross at [facility] in [place], please review.

## 2024-11-12 NOTE — PROGRESS NOTES
Ambulatory Visit  Name: Nisha Worrell      : 1965      MRN: [id number]  Encounter Provider: Felipa López DO  Encounter Date: 2024   Encounter department: [facility] RHEUMATOLOGY [address]    Assessment & Plan  Multiple joint pain  Patient is a 59-year-old female presenting for further evaluation of fatigue and pain in bilateral hips and knees.  Pain is worse with use as the day goes on.  Denies any morning stiffness or joint swelling.  Osteoarthritic changes appreciated on exam.  Previous x-rays reviewed which show osteoarthritic changes.  Suspect patient's pain is related to OA rather than inflammatory arthritis.  No synovitis or joint tenderness on exam today.  RF and MALINDA negative in the past.  Patient does endorse dry eyes and dry mouth.  Dry eyes may also be due to contact use.  Even though MALINDA is negative, can consider checking specific SSA and SSB to rule out Sjogren syndrome however, patient is worried about coverage with her insurance.  Given that management would still be conservative, will hold off for now.  No other signs or symptoms of a connective tissue disease such as mucocutaneous manifestations, recurrent ulcers, Raynaud's phenomenon, inflammatory joint pain, cytopenias.  Overall, low suspicion for a systemic inflammatory process  -Discussed conservative measures for osteoarthritis such as Voltaren gel and Tylenol (no more than 3 g a day)  -Physical therapy referral placed to help with knee and hip pain    Orders:    Ambulatory Referral to Rheumatology    Ambulatory Referral to Physical Therapy; Future    Chronic pain of both knees  As above  Orders:    Ambulatory Referral to Physical Therapy; Future      RTC as needed     History of Present Illness     Nisha Worrell is a 59 y.o. female with a hx of GERD, hypothyroidism and anxiety who presents for further evaluation of joint pain.     History obtained from : patient    Patient reports that she has been experiencing  worsening fatigue for the past few months.  Patient reports that she has the most amount of energy in the morning however by 3 PM she is very tired.  Patient reports that she has made several dietary changes and cut out processed food and has noticed some difference.  Patient also reports joint pain in the hip and knees.  Reports that the pain is worse at the day goes on and she is on her feet.  Has difficulty sometimes with going up and down stairs.  Patient denies prolonged morning stiffness or joint swelling.  Reports some constant pain in her wrists.  Patient takes Advil as needed which helps.  Patient also reports getting intermittent leg cramps which is worse at night.    Patient states that she was told she may have Raynaud's phenomenon because her hands are always cold.  Reports that her hands turn red in the cold.  Denies any triphasic color changes.    Reports dry eyes and dry mouth.  Reports that she drinks a lot of water.  Uses refresh eyedrops for the dry eyes.  Patient does report that she wears contacts.    Patient states that she was diagnosed with Hashimoto's many years ago and has been on therapy for about 30 years.  Reports that her thyroid disease has been stable.    Patient states that she was recently also diagnosed with frontal alopecia and is following with Derm.  Patient also has a history of rosacea.      Family hx: Multiple family members with thyroid disease    Review of Systems  Complete ROS conducted as per HPI. In addition, denies:  Fever  Photosensitive rash  Recurrent oral ulcers  Muscle weakness  Uveitis  Dactylitis  Chest pain  SOB  Pleurisy  Gross hematuria  Foamy urine  Raynaud's  Joint issues other than noted above    Past Medical History:   Diagnosis Date    Abnormal Pap smear of cervix     Bilateral ovarian cysts     Disease of thyroid gland        Current Outpatient Medications on File Prior to Visit   Medication Sig Dispense Refill    clobetasol propionate (CLOBEX) 0.05 %  "lotion 1 Application daily at bedtime      levothyroxine 75 mcg tablet Take 1 tablet (75 mcg total) by mouth daily 90 tablet 1    Loratadine (CLARITIN PO) Take by mouth      LORazepam (ATIVAN) 1 mg tablet TAKE ONE TABLET BY MOUTH EVERY DAY 30 tablet 3    metroNIDAZOLE (METROGEL) 1 % gel Apply topically daily 45 g 2     No current facility-administered medications on file prior to visit.      Social History     Tobacco Use    Smoking status: Never     Passive exposure: Never    Smokeless tobacco: Never   Vaping Use    Vaping status: Never Used   Substance and Sexual Activity    Alcohol use: Yes     Comment: occasional as per Susanna    Drug use: Never    Sexual activity: Yes     Partners: Male         Objective     /88 (BP Location: Left arm, Patient Position: Sitting, Cuff Size: Adult)   Pulse 63   Temp 98.2 °F (36.8 °C) (Tympanic Core)   Resp 16   Ht 5' 3\" (1.6 m)   Wt 94.3 kg (208 lb)   SpO2 98%   BMI 36.85 kg/m²     Physical Exam  General appearance: normal appearing, no acute distress  Skin: normal, no rashes  HEENT: normal, moist oropharynx, no nasal or oral ulcers  Lymph nodes: no palpable adenopathy  Lungs: normal respiratory effort, comfortable on room air, lungs clear to auscultation b/l   Heart: normal heart sounds, normal rate, normal rhythm,  Abdomen: soft, normal bowel sounds, no tenderness  Neurologic: no obvious neurological deficits   Extremities: no edema, warm and well perfused     Musculoskeletal Exam:   - Observation: Heberden's and Daphne nodes noted  - Palpation: no joint tenderness, crepitus of bilateral knees present  - Synovitis: absent  - Joint effusions: absent  - ROM: intact throughout  - Muscle Strength: 5/5 throughout     I have personally reviewed notes, labs, and imaging available in the chart.     RF negative   MALINDA by IFA negative   Cr 0.81  GFR 85    XR hips 9/11/23  LEFT HIP:  There is no acute fracture or dislocation.  Mild left hip osteoarthritis is seen.  No lytic " or blastic osseous lesion.  Soft tissues are unremarkable.     RIGHT HIP:  There is no acute fracture or dislocation.  Mild right hip osteoarthritis is seen.  No lytic or blastic osseous lesion.  Soft tissues are unremarkable      Felipa López DO, MEET, [facility]  [facility]

## 2024-11-14 ENCOUNTER — CONSULT (OUTPATIENT)
Age: 59
End: 2024-11-14

## 2024-11-14 VITALS
WEIGHT: 208 LBS | SYSTOLIC BLOOD PRESSURE: 130 MMHG | DIASTOLIC BLOOD PRESSURE: 88 MMHG | RESPIRATION RATE: 16 BRPM | OXYGEN SATURATION: 98 % | HEIGHT: 63 IN | BODY MASS INDEX: 36.86 KG/M2 | TEMPERATURE: 98.2 F | HEART RATE: 63 BPM

## 2024-11-14 DIAGNOSIS — M25.561 CHRONIC PAIN OF BOTH KNEES: Primary | ICD-10-CM

## 2024-11-14 DIAGNOSIS — G89.29 CHRONIC PAIN OF BOTH KNEES: Primary | ICD-10-CM

## 2024-11-14 DIAGNOSIS — M25.50 MULTIPLE JOINT PAIN: ICD-10-CM

## 2024-11-14 DIAGNOSIS — M25.562 CHRONIC PAIN OF BOTH KNEES: Primary | ICD-10-CM

## 2024-12-10 ENCOUNTER — RA CDI HCC (OUTPATIENT)
Dept: OTHER | Facility: HOSPITAL | Age: 59
End: 2024-12-10

## 2024-12-27 DIAGNOSIS — E03.9 HYPOTHYROIDISM, UNSPECIFIED TYPE: ICD-10-CM

## 2024-12-28 RX ORDER — LEVOTHYROXINE SODIUM 75 UG/1
75 TABLET ORAL DAILY
Qty: 90 TABLET | Refills: 1 | Status: SHIPPED | OUTPATIENT
Start: 2024-12-28

## 2025-01-09 ENCOUNTER — RA CDI HCC (OUTPATIENT)
Dept: OTHER | Facility: HOSPITAL | Age: 60
End: 2025-01-09

## 2025-01-09 NOTE — PROGRESS NOTES
HCC coding opportunities       Chart reviewed, no opportunity found: CHART REVIEWED, NO OPPORTUNITY FOUND   This is a reminder to address (resolve/update/assess) ALL HCC (risk adjustment) codes as found on active problem list for 2025 as patient scores reset to zero LITO.     Patients Insurance        Commercial Insurance: [employer]

## 2025-01-15 NOTE — PROGRESS NOTES
Name: Nisha Worrell      : 1965      MRN: [id number]  Encounter Provider: Jordy Evans MD  Encounter Date: 2025   Encounter department: [facility]  :  Assessment & Plan  Well adult exam         Rosacea  Patient is stable  and will continue present plan of care and reassess at next routine visit. All questions about this problem from patient were answered today.        Hypothyroidism due to Hashimoto thyroiditis  Patient to continue current dose of thyroid medicine and recheck TSH in 6 months        Class 1 obesity due to excess calories without serious comorbidity with body mass index (BMI) of 32.0 to 32.9 in adult  Patient to increase exercise and partake of a diet with less calories to promote  weight loss        Anxiety  Patient to continue utilizing medical therapy as well as counseling sources as applicable to condition. If suicidal thought or fear of suicide attempt, to call 911 and seek help immediately. Medications and therapy reviewed today and all patient  questions answered today.        Gastroesophageal reflux disease, unspecified whether esophagitis present  Patient to continue with present therapy and decrease caffeine, avoid ETOH and smoking to decrease acid production. Pt should also cease eating prior to bedtime and avoid excessive fluid intake prior to sleep. May use antacids as needed for breakthrough GERD. All pateint questions answered today about this condition.        Screening cholesterol level    Orders:    Comprehensive metabolic panel; Future    Lipid Panel with Direct LDL reflex; Future    Hypothyroidism, unspecified type    Orders:    levothyroxine 75 mcg tablet; Take 1 tablet (75 mcg total) by mouth daily    Left upper quadrant abdominal pain    Orders:    Ambulatory Referral to Gastroenterology; Future           History of Present Illness     59 YOWF Female today for her yearly physical exam as well as checkup on multimedical problems  patient with history of hypothyroidism also with frontal alopecia that she hides very well history of anxiety history of rosacea and also history of some GERD and is here for evaluation.  Patient recently had seen her dermatologist and as well had seen her rheumatologist for evaluation for inflammation.  Patient is doing well from that standpoint and is recently been started on some minoxidil for hair growth patient does really well with the medication and also has been on send Call clobetasol to help with hair growth in the frontal area of her forehead.  Patient also is taking levothyroxine when she needs refills on today also.  Patient also clean but having some left upper quadrant discomfort and recently had a was was prescribed a Cologuard and has yet to do that.  Discussed with that if she is having some left upper quadrant discomfort she really should get a colonoscopy and I will make a referral to get that done with Dr. Hooper.  Patient had her lab work reviewed and she does not have any anemia her iron stores are looking really good although she is due for her CBC.  Her thyroid and is doing well she does have some hyperthyroidism that we are aware of with Hashimoto's but that appears to be stable.  She does see endocrinology for this problem also and they are following of this problem with her.  Patient will need some refills on her anxiety medicine we will see her back in approximately 6 months and we will review her colonoscopy when it is available.      Review of Systems   Constitutional:  Negative for activity change, appetite change, fatigue and fever.   HENT:  Negative for congestion, ear pain, postnasal drip, rhinorrhea, sinus pressure, sinus pain, sneezing and sore throat.    Eyes:  Negative for pain and redness.   Respiratory:  Negative for apnea, cough, chest tightness, shortness of breath and wheezing.    Cardiovascular:  Negative for chest pain, palpitations and leg swelling.   Gastrointestinal:   "Positive for abdominal pain. Negative for constipation, diarrhea, nausea and vomiting.   Endocrine: Negative for cold intolerance and heat intolerance.   Genitourinary:  Negative for difficulty urinating, dysuria, frequency, hematuria and urgency.   Musculoskeletal:  Negative for arthralgias, back pain, gait problem and myalgias.   Skin:  Negative for rash.   Neurological:  Negative for dizziness, speech difficulty, weakness, numbness and headaches.   Hematological:  Does not bruise/bleed easily.   Psychiatric/Behavioral:  Negative for agitation, confusion and hallucinations.        Objective   /72 (BP Location: Left arm, Patient Position: Sitting, Cuff Size: Large)   Pulse 63   Temp 98 °F (36.7 °C) (Temporal)   Ht 5' 3\" (1.6 m)   Wt 95.7 kg (211 lb)   SpO2 98%   BMI 37.38 kg/m²      Physical Exam  Constitutional:       Appearance: Normal appearance. She is not ill-appearing.   HENT:      Head: Normocephalic and atraumatic.      Right Ear: Tympanic membrane normal.      Left Ear: Tympanic membrane normal.      Nose: Nose normal.      Mouth/Throat:      Mouth: Mucous membranes are moist.   Eyes:      Extraocular Movements: Extraocular movements intact.      Conjunctiva/sclera: Conjunctivae normal.      Pupils: Pupils are equal, round, and reactive to light.   Cardiovascular:      Rate and Rhythm: Normal rate and regular rhythm.   Pulmonary:      Effort: Pulmonary effort is normal. No respiratory distress.      Breath sounds: Normal breath sounds. No wheezing.   Abdominal:      General: Bowel sounds are normal.      Palpations: Abdomen is soft.      Tenderness: There is abdominal tenderness in the left upper quadrant. There is no guarding or rebound.       Musculoskeletal:         General: No tenderness. Normal range of motion.      Cervical back: Normal range of motion and neck supple.      Right lower leg: No edema.      Left lower leg: No edema.   Skin:     General: Skin is warm and dry.   Neurological: "      Mental Status: She is alert and oriented to person, place, and time.   Psychiatric:         Mood and Affect: Mood normal.         Behavior: Behavior normal.         Thought Content: Thought content normal.         Judgment: Judgment normal.

## 2025-01-15 NOTE — ASSESSMENT & PLAN NOTE
Patient to continue utilizing medical therapy as well as counseling sources as applicable to condition. If suicidal thought or fear of suicide attempt, to call 911 and seek help immediately. Medications and therapy reviewed today and all patient  questions answered today.

## 2025-01-15 NOTE — ASSESSMENT & PLAN NOTE
Patient to continue with present therapy and decrease caffeine, avoid ETOH and smoking to decrease acid production. Pt should also cease eating prior to bedtime and avoid excessive fluid intake prior to sleep. May use antacids as needed for breakthrough GERD. All pateint questions answered today about this condition.

## 2025-01-15 NOTE — ASSESSMENT & PLAN NOTE
Patient to increase exercise and partake of a diet with less calories to promote  weight loss

## 2025-01-15 NOTE — ASSESSMENT & PLAN NOTE
Patient is stable  and will continue present plan of care and reassess at next routine visit. All questions about this problem from patient were answered today.

## 2025-01-16 ENCOUNTER — OFFICE VISIT (OUTPATIENT)
Dept: FAMILY MEDICINE CLINIC | Facility: CLINIC | Age: 60
End: 2025-01-16
Payer: COMMERCIAL

## 2025-01-16 VITALS
BODY MASS INDEX: 37.39 KG/M2 | OXYGEN SATURATION: 98 % | HEART RATE: 63 BPM | HEIGHT: 63 IN | WEIGHT: 211 LBS | DIASTOLIC BLOOD PRESSURE: 72 MMHG | SYSTOLIC BLOOD PRESSURE: 132 MMHG | TEMPERATURE: 98 F

## 2025-01-16 DIAGNOSIS — E06.3 HYPOTHYROIDISM DUE TO HASHIMOTO THYROIDITIS: ICD-10-CM

## 2025-01-16 DIAGNOSIS — E03.9 HYPOTHYROIDISM, UNSPECIFIED TYPE: ICD-10-CM

## 2025-01-16 DIAGNOSIS — F41.9 ANXIETY: ICD-10-CM

## 2025-01-16 DIAGNOSIS — Z13.220 SCREENING CHOLESTEROL LEVEL: ICD-10-CM

## 2025-01-16 DIAGNOSIS — R10.12 LEFT UPPER QUADRANT ABDOMINAL PAIN: ICD-10-CM

## 2025-01-16 DIAGNOSIS — E66.811 CLASS 1 OBESITY DUE TO EXCESS CALORIES WITHOUT SERIOUS COMORBIDITY WITH BODY MASS INDEX (BMI) OF 32.0 TO 32.9 IN ADULT: ICD-10-CM

## 2025-01-16 DIAGNOSIS — E66.09 CLASS 1 OBESITY DUE TO EXCESS CALORIES WITHOUT SERIOUS COMORBIDITY WITH BODY MASS INDEX (BMI) OF 32.0 TO 32.9 IN ADULT: ICD-10-CM

## 2025-01-16 DIAGNOSIS — L71.9 ROSACEA: ICD-10-CM

## 2025-01-16 DIAGNOSIS — Z00.00 WELL ADULT EXAM: Primary | ICD-10-CM

## 2025-01-16 DIAGNOSIS — K21.9 GASTROESOPHAGEAL REFLUX DISEASE, UNSPECIFIED WHETHER ESOPHAGITIS PRESENT: ICD-10-CM

## 2025-01-16 PROCEDURE — 99396 PREV VISIT EST AGE 40-64: CPT | Performed by: FAMILY MEDICINE

## 2025-01-16 RX ORDER — LEVOTHYROXINE SODIUM 75 UG/1
75 TABLET ORAL DAILY
Qty: 90 TABLET | Refills: 1 | Status: SHIPPED | OUTPATIENT
Start: 2025-01-16

## 2025-01-16 RX ORDER — MINOXIDIL 2.5 MG/1
TABLET ORAL
COMMUNITY
Start: 2025-01-09

## 2025-03-01 DIAGNOSIS — F41.9 ANXIETY: ICD-10-CM

## 2025-03-03 RX ORDER — LORAZEPAM 1 MG/1
1 TABLET ORAL DAILY
Qty: 30 TABLET | Refills: 3 | Status: SHIPPED | OUTPATIENT
Start: 2025-03-03

## 2025-06-07 ENCOUNTER — OFFICE VISIT (OUTPATIENT)
Dept: URGENT CARE | Facility: CLINIC | Age: 60
End: 2025-06-07
Payer: COMMERCIAL

## 2025-06-07 VITALS
HEIGHT: 63 IN | TEMPERATURE: 98.1 F | HEART RATE: 74 BPM | OXYGEN SATURATION: 98 % | BODY MASS INDEX: 37.39 KG/M2 | DIASTOLIC BLOOD PRESSURE: 68 MMHG | RESPIRATION RATE: 15 BRPM | WEIGHT: 211 LBS | SYSTOLIC BLOOD PRESSURE: 111 MMHG

## 2025-06-07 DIAGNOSIS — L23.7 POISON IVY: Primary | ICD-10-CM

## 2025-06-07 PROCEDURE — G0382 LEV 3 HOSP TYPE B ED VISIT: HCPCS | Performed by: NURSE PRACTITIONER

## 2025-06-07 PROCEDURE — S9083 URGENT CARE CENTER GLOBAL: HCPCS | Performed by: NURSE PRACTITIONER

## 2025-06-07 RX ORDER — PREDNISONE 20 MG/1
40 TABLET ORAL DAILY
Qty: 10 TABLET | Refills: 0 | Status: SHIPPED | OUTPATIENT
Start: 2025-06-07 | End: 2025-06-07

## 2025-06-07 RX ORDER — PREDNISONE 10 MG/1
10 TABLET ORAL DAILY
Qty: 21 TABLET | Refills: 0 | Status: SHIPPED | OUTPATIENT
Start: 2025-06-07

## 2025-06-07 NOTE — PATIENT INSTRUCTIONS
"Prednisone taper as directed  Cool compressed to the area  Avoid scratching   Benadryl as needed for itching- may cause drowsiness  Follow up with your PCP as needed     Patient Education     Poison ivy   The Basics   Written by the doctors and editors at [facility]   What is poison ivy? -- Poison ivy is a plant that can cause an itchy skin rash. When people have this rash, they often say, \"I got poison ivy.\"  The same substance that causes the poison ivy rash is also found in poison oak, poison sumac, the ginkgo fruit, and zbigniew peels.  How did I get poison ivy? -- You might have gotten poison ivy if you:   Touched a poison ivy plant   Touched something that had the plant's oils on it (such as clothing, animal fur, or garden tools)   Were nearby when poison ivy plants were being burned  What does poison ivy look like? -- Poison ivy and poison oak have 3 leaves coming off a single stem (figure 1). That's why there is a saying, \"leaves of 3, let them be.\" The leaves start out green, but they can turn red or brown. Even dead plants can cause the rash.  What will happen to my rash? -- Your rash should go away within 1 to 3 weeks, but it might form blisters before it does. Blisters are little bubbles of skin that are filled with fluid. They can show up in different places at different times. But that does not mean that the rash is spreading. Touching the blisters or the fluid inside the blisters will not spread the rash.  What can I do to relieve the itching? -- You can:   Avoid scratching (that makes the itch worse)   Try putting a cold, wet cloth or paper towels on your rash   Use calamine lotion   If your blisters have started to pop, use skin products that have aluminum acetate in them (examples include Burrow's solution and Domeboro)  Should I see a doctor or nurse? -- You should see your doctor or nurse if:   Your rash is severe   Most of your body is affected   Your face or genitals are affected   You have a lot of " swelling   You are not sure that you have poison ivy   Your rash oozes pus or gives other signs of being infected   Your rash does not get better after 2 to 3 weeks  If you have a very bad rash, your doctor or nurse can prescribe medicines called steroids. These medicines can reduce swelling and relieve itching. Steroids come in creams, ointments, and pills. Your doctor or nurse will decide what form you should use.  Steroid creams and ointments are also sold without a prescription. But non-prescription versions are not usually strong enough to help with poison ivy.  Some creams or lotions can make your rash worse -- The products listed below sometimes cause a reaction that makes your skin more itchy or irritated:   Antihistamine creams or lotions   Numbing products that have benzocaine   Antibiotic ointments that have neomycin or bacitracin  How do I keep from getting poison ivy again? -- You can:   Stay away from poison ivy, even if the plant is dead   Wear long sleeves and pants when working near poison ivy, and wash your clothes right away when you are done   Wear thick vinyl gloves when doing yard work (latex and rubber gloves do not always protect against poison ivy)   As soon as possible, gently wash the area if you do touch poison ivy (do not rub or scrub). It might help to use a damp washcloth with liquid dish soap under running hot water.   Avoid burning poison ivy plants  All topics are updated as new evidence becomes available and our peer review process is complete.  This topic retrieved from [employer] on: Feb 26, 2024.  Topic 26328 Version 9.0  Release: 32.2.4 - C32.56  © 2024 UpToDate, Inc. and/or its affiliates. All rights reserved.  figure 1: Poison ivy, poison oak, and poison sumac     Generally, poison ivy and poison oak have 3 leaves on each stem. Poison sumac has 5, 7, or more leaves on each stem.  Graphic 84154 Version 6.0  Consumer Information Use and Disclaimer   Disclaimer: This generalized  information is a limited summary of diagnosis, treatment, and/or medication information. It is not meant to be comprehensive and should be used as a tool to help the user understand and/or assess potential diagnostic and treatment options. It does NOT include all information about conditions, treatments, medications, side effects, or risks that may apply to a specific patient. It is not intended to be medical advice or a substitute for the medical advice, diagnosis, or treatment of a health care provider based on the health care provider's examination and assessment of a patient's specific and unique circumstances. Patients must speak with a health care provider for complete information about their health, medical questions, and treatment options, including any risks or benefits regarding use of medications. This information does not endorse any treatments or medications as safe, effective, or approved for treating a specific patient. UpToDate, Inc. and its affiliates disclaim any warranty or liability relating to this information or the use thereof.The use of this information is governed by the Terms of Use, available at https://www.wolterskluwer.com/en/know/clinical-effectiveness-terms. 2024© UpToDate, Inc. and its affiliates and/or licensors. All rights reserved.  Copyright   © 2024 UpToDate, Inc. and/or its affiliates. All rights reserved.

## 2025-06-07 NOTE — PROGRESS NOTES
[facility]        NAME: Nisha Worrell is a 59 y.o. female  : 1965    MRN: [id number]  DATE: 2025  TIME: 9:22 AM    Assessment and Plan   Poison ivy [L23.7]  1. Poison ivy  predniSONE 10 mg tablet    DISCONTINUED: predniSONE 20 mg tablet            Patient Instructions   Prednisone taper as directed  Cool compressed to the area  Avoid scratching   Benadryl as needed for itching- may cause drowsiness  Follow up with your PCP as needed     Follow up with PCP in 3-5 days.  Proceed to  ER if symptoms worsen.    Chief Complaint     Chief Complaint   Patient presents with    Rash     Pt stated that she has poison Oak- on her face and arms. She had this before and did use a topical cream but she did not  use anything today.          History of Present Illness       Patient is a 59-year-old female presenting with suspected poison oak rash to her ears and face.  She states this started a few days ago.  She initially felt that her scalp was itchy which then progressed to her ears and face.  She does have a few diffuse macular areas to bilateral forearms.  She did use over-the-counter medication with minimal relief.    Rash  Pertinent negatives include no cough or fever.       Review of Systems   Review of Systems   Constitutional:  Negative for activity change, chills and fever.   Respiratory:  Negative for cough.    Skin:  Positive for rash. Negative for color change.         Current Medications     Current Medications[1]    Current Allergies     Allergies as of 2025 - Reviewed 2025   Allergen Reaction Noted    Eggs or egg-derived products - food allergy Hives 2022    Aleve [naproxen] Rash 2019    Penicillins Other (See Comments) 2019            The following portions of the patient's history were reviewed and updated as appropriate: allergies, current medications, past family history, past medical history, past social history, past surgical history and problem list.  "    Past Medical History[2]    Past Surgical History[3]    Family History[4]      Medications have been verified.        Objective   /68   Pulse 74   Temp 98.1 °F (36.7 °C)   Resp 15   Ht 5' 3\" (1.6 m)   Wt 95.7 kg (211 lb)   SpO2 98%   BMI 37.38 kg/m²        Physical Exam     Physical Exam  Vitals reviewed.   Constitutional:       General: She is awake.      Appearance: Normal appearance.   HENT:      Head: Normocephalic.      Right Ear: Hearing, tympanic membrane, ear canal and external ear normal.      Left Ear: Hearing, tympanic membrane, ear canal and external ear normal.     Cardiovascular:      Rate and Rhythm: Normal rate.   Pulmonary:      Effort: Pulmonary effort is normal.     Skin:     General: Skin is warm and moist.      Findings: Rash present. Rash is macular and papular.      Comments: Macular papular rash to face and forearms no evidence of drainage, swelling, or erythema.     Neurological:      General: No focal deficit present.      Mental Status: She is alert and oriented to person, place, and time.     Psychiatric:         Behavior: Behavior is cooperative.                        [1]   Current Outpatient Medications:     predniSONE 10 mg tablet, Take 1 tablet (10 mg total) by mouth daily 60mg days 1, 50mg day 2, 40mg day 3, 30mg day 4, 20 mg day 5, 10mg day 6., Disp: 21 tablet, Rfl: 0    clobetasol propionate (CLOBEX) 0.05 % lotion, 1 Application daily at bedtime, Disp: , Rfl:     levothyroxine 75 mcg tablet, Take 1 tablet (75 mcg total) by mouth daily, Disp: 90 tablet, Rfl: 1    Loratadine (CLARITIN PO), Take by mouth, Disp: , Rfl:     LORazepam (ATIVAN) 1 mg tablet, TAKE ONE TABLET BY MOUTH EVERY DAY, Disp: 30 tablet, Rfl: 3    metroNIDAZOLE (METROGEL) 1 % gel, Apply topically daily, Disp: 45 g, Rfl: 2    minoxidil (LONITEN) 2.5 mg tablet, , Disp: , Rfl:   [2]   Past Medical History:  Diagnosis Date    Abnormal Pap smear of cervix     Bilateral ovarian cysts     Disease of thyroid " gland    [3] No past surgical history on file.  [4]   Family History  Problem Relation Name Age of Onset    Thyroid disease Mother      Hypertension Mother      Heart disease Father      Diabetes Father      Hypertension Father      Diabetes Sister      Other Sister          car accident    Heart disease Maternal Grandmother      Diabetes Maternal Grandmother      Cancer Maternal Grandfather      No Known Problems Paternal Grandmother      No Known Problems Paternal Grandfather      Breast cancer Other great aunt     No Known Problems Paternal Uncle

## 2025-06-27 DIAGNOSIS — F41.9 ANXIETY: ICD-10-CM

## 2025-06-30 RX ORDER — LORAZEPAM 1 MG/1
1 TABLET ORAL DAILY
Qty: 30 TABLET | Refills: 3 | Status: SHIPPED | OUTPATIENT
Start: 2025-06-30

## 2025-07-08 LAB
ALBUMIN SERPL-MCNC: 4.3 G/DL (ref 3.6–5.1)
ALBUMIN/GLOB SERPL: 1.7 (CALC) (ref 1–2.5)
ALP SERPL-CCNC: 45 U/L (ref 37–153)
ALT SERPL-CCNC: 23 U/L (ref 6–29)
AST SERPL-CCNC: 18 U/L (ref 10–35)
BILIRUB SERPL-MCNC: 0.7 MG/DL (ref 0.2–1.2)
BUN SERPL-MCNC: 16 MG/DL (ref 7–25)
BUN/CREAT SERPL: ABNORMAL (CALC) (ref 6–22)
CALCIUM SERPL-MCNC: 9.4 MG/DL (ref 8.6–10.4)
CHLORIDE SERPL-SCNC: 107 MMOL/L (ref 98–110)
CHOLEST SERPL-MCNC: 161 MG/DL
CHOLEST/HDLC SERPL: 4.5 (CALC)
CO2 SERPL-SCNC: 27 MMOL/L (ref 20–32)
CREAT SERPL-MCNC: 0.82 MG/DL (ref 0.5–1.03)
GFR/BSA.PRED SERPLBLD CYS-BASED-ARV: 83 ML/MIN/1.73M2
GLOBULIN SER CALC-MCNC: 2.6 G/DL (CALC) (ref 1.9–3.7)
GLUCOSE SERPL-MCNC: 102 MG/DL (ref 65–99)
HDLC SERPL-MCNC: 36 MG/DL
LDLC SERPL CALC-MCNC: 106 MG/DL (CALC)
NONHDLC SERPL-MCNC: 125 MG/DL (CALC)
POTASSIUM SERPL-SCNC: 4.5 MMOL/L (ref 3.5–5.3)
PROT SERPL-MCNC: 6.9 G/DL (ref 6.1–8.1)
SODIUM SERPL-SCNC: 139 MMOL/L (ref 135–146)
TRIGL SERPL-MCNC: 98 MG/DL

## 2025-07-09 DIAGNOSIS — E03.9 HYPOTHYROIDISM, UNSPECIFIED TYPE: ICD-10-CM

## 2025-07-10 DIAGNOSIS — E03.9 HYPOTHYROIDISM, UNSPECIFIED TYPE: Primary | ICD-10-CM

## 2025-07-10 RX ORDER — LEVOTHYROXINE SODIUM 75 UG/1
75 TABLET ORAL DAILY
Qty: 30 TABLET | Refills: 0 | Status: SHIPPED | OUTPATIENT
Start: 2025-07-10

## 2025-07-17 ENCOUNTER — OFFICE VISIT (OUTPATIENT)
Dept: FAMILY MEDICINE CLINIC | Facility: CLINIC | Age: 60
End: 2025-07-17
Payer: COMMERCIAL

## 2025-07-17 VITALS
HEART RATE: 84 BPM | OXYGEN SATURATION: 98 % | HEIGHT: 63 IN | BODY MASS INDEX: 36.68 KG/M2 | WEIGHT: 207 LBS | DIASTOLIC BLOOD PRESSURE: 72 MMHG | RESPIRATION RATE: 16 BRPM | TEMPERATURE: 97.1 F | SYSTOLIC BLOOD PRESSURE: 100 MMHG

## 2025-07-17 DIAGNOSIS — R30.0 DYSURIA: ICD-10-CM

## 2025-07-17 DIAGNOSIS — Z78.0 POST-MENOPAUSAL: ICD-10-CM

## 2025-07-17 DIAGNOSIS — F41.9 ANXIETY: ICD-10-CM

## 2025-07-17 DIAGNOSIS — K21.00 GASTROESOPHAGEAL REFLUX DISEASE WITH ESOPHAGITIS WITHOUT HEMORRHAGE: ICD-10-CM

## 2025-07-17 DIAGNOSIS — Z12.31 ENCOUNTER FOR SCREENING MAMMOGRAM FOR BREAST CANCER: Primary | ICD-10-CM

## 2025-07-17 DIAGNOSIS — E06.3 HYPOTHYROIDISM DUE TO HASHIMOTO THYROIDITIS: ICD-10-CM

## 2025-07-17 DIAGNOSIS — L71.9 ROSACEA: ICD-10-CM

## 2025-07-17 DIAGNOSIS — E66.09 CLASS 1 OBESITY DUE TO EXCESS CALORIES WITHOUT SERIOUS COMORBIDITY WITH BODY MASS INDEX (BMI) OF 32.0 TO 32.9 IN ADULT: ICD-10-CM

## 2025-07-17 DIAGNOSIS — E66.811 CLASS 1 OBESITY DUE TO EXCESS CALORIES WITHOUT SERIOUS COMORBIDITY WITH BODY MASS INDEX (BMI) OF 32.0 TO 32.9 IN ADULT: ICD-10-CM

## 2025-07-17 LAB
BILIRUB UR QL STRIP: NEGATIVE
CLARITY UR: CLEAR
COLOR UR: NORMAL
GLUCOSE UR STRIP-MCNC: NEGATIVE MG/DL
HGB UR QL STRIP.AUTO: NEGATIVE
KETONES UR STRIP-MCNC: NEGATIVE MG/DL
LEUKOCYTE ESTERASE UR QL STRIP: NEGATIVE
NITRITE UR QL STRIP: NEGATIVE
PH UR STRIP.AUTO: 6.5 [PH]
PROT UR STRIP-MCNC: NEGATIVE MG/DL
SP GR UR STRIP.AUTO: 1.01 (ref 1–1.03)
UROBILINOGEN UR STRIP-ACNC: <2 MG/DL

## 2025-07-17 PROCEDURE — 81003 URINALYSIS AUTO W/O SCOPE: CPT | Performed by: FAMILY MEDICINE

## 2025-07-17 PROCEDURE — 99214 OFFICE O/P EST MOD 30 MIN: CPT | Performed by: FAMILY MEDICINE

## 2025-07-17 NOTE — ASSESSMENT & PLAN NOTE
Patient to continue current dose of thyroid medicine and recheck TSH in 6 months   Orders:  •  TSH, 3rd generation with Free T4 reflex; Future  •  Thyroid Antibodies Panel; Future

## 2025-07-17 NOTE — PROGRESS NOTES
Name: Nisha Worrell      : 1965      MRN: [id number]  Encounter Provider: Jordy Evans MD  Encounter Date: 2025   Encounter department: [facility] [place]  :  Assessment & Plan  Encounter for screening mammogram for breast cancer    Orders:  •  Mammo screening bilateral w 3d and cad; Future    Anxiety  Patient to continue utilizing medical therapy as well as counseling sources as applicable to condition. If suicidal thought or fear of suicide attempt, to call 911 and seek help immediately. Medications and therapy reviewed today and all patient  questions answered today.        Class 1 obesity due to excess calories without serious comorbidity with body mass index (BMI) of 32.0 to 32.9 in adult  Patient to increase exercise and partake of a diet with less calories to promote  weight loss        Gastroesophageal reflux disease with esophagitis without hemorrhage  Patient to continue with present therapy and decrease caffeine, avoid ETOH and smoking to decrease acid production. Pt should also cease eating prior to bedtime and avoid excessive fluid intake prior to sleep. May use antacids as needed for breakthrough GERD. All pateint questions answered today about this condition.        Hypothyroidism due to Hashimoto thyroiditis  Patient to continue current dose of thyroid medicine and recheck TSH in 6 months   Orders:  •  TSH, 3rd generation with Free T4 reflex; Future  •  Thyroid Antibodies Panel; Future    Rosacea  Patient is stable  and will continue present plan of care and reassess at next routine visit. All questions about this problem from patient were answered today.        Post-menopausal    Orders:  •  DXA bone density spine hip and pelvis    Dysuria    Orders:  •  Urinalysis with microscopic; Future          Depression Screening and Follow-up Plan: Patient was screened for depression during today's encounter. They screened negative with a PHQ-2 score of 0.      History  of Present Illness   Use disorder is a 59-year-old female today for checkup for multimedical problems and recheck on her recent lab work.  Patient history of Hashimoto's thyroiditis and is doing well we will get a monitor her thyroid as well as her T thyroid antibodies at this point.  Patient also is doing well with her medications and will call if she needs refills.  Patient was he had her cholesterol checked along with her sugar and it was doing very well.  Discussed with patient need for Cologuard as well as her mammogram and DEXA scan and she will schedule as appropriate.  I also did order a urinalysis to evaluate for any dysuria and we will see about doing her thyroid lab work.  Patient is due to come back to see us in 6 months but we will review the results of her recent lab work either in person if is a problem I will notify her by phone.       Review of Systems   Constitutional:  Negative for activity change, appetite change, fatigue and fever.   HENT:  Negative for congestion, ear pain, postnasal drip, rhinorrhea, sinus pressure, sinus pain, sneezing and sore throat.    Eyes:  Negative for pain and redness.   Respiratory:  Negative for apnea, cough, chest tightness, shortness of breath and wheezing.    Cardiovascular:  Negative for chest pain, palpitations and leg swelling.   Gastrointestinal:  Negative for abdominal pain, constipation, diarrhea, nausea and vomiting.   Endocrine: Negative for cold intolerance and heat intolerance.   Genitourinary:  Negative for difficulty urinating, dysuria, frequency, hematuria and urgency.   Musculoskeletal:  Negative for arthralgias, back pain, gait problem and myalgias.   Skin:  Negative for rash.   Neurological:  Negative for dizziness, speech difficulty, weakness, numbness and headaches.   Hematological:  Does not bruise/bleed easily.   Psychiatric/Behavioral:  Negative for agitation, confusion and hallucinations.        Objective   /72 (BP Location: Left arm,  "Patient Position: Sitting, Cuff Size: Large)   Pulse 84   Temp (!) 97.1 °F (36.2 °C) (Temporal)   Resp 16   Ht 5' 3\" (1.6 m)   Wt 93.9 kg (207 lb)   SpO2 98%   BMI 36.67 kg/m²      Physical Exam  Constitutional:       Appearance: Normal appearance. She is not ill-appearing.   HENT:      Head: Normocephalic and atraumatic.      Right Ear: Tympanic membrane normal.      Left Ear: Tympanic membrane normal.      Nose: Nose normal.      Mouth/Throat:      Mouth: Mucous membranes are moist.     Eyes:      Extraocular Movements: Extraocular movements intact.      Conjunctiva/sclera: Conjunctivae normal.      Pupils: Pupils are equal, round, and reactive to light.       Cardiovascular:      Rate and Rhythm: Normal rate and regular rhythm.   Pulmonary:      Effort: Pulmonary effort is normal. No respiratory distress.      Breath sounds: Normal breath sounds. No wheezing.   Abdominal:      General: Bowel sounds are normal.      Palpations: Abdomen is soft.      Tenderness: There is no abdominal tenderness.     Musculoskeletal:         General: No tenderness. Normal range of motion.      Cervical back: Normal range of motion and neck supple.      Right lower leg: No edema.      Left lower leg: No edema.     Skin:     General: Skin is warm and dry.     Neurological:      Mental Status: She is alert and oriented to person, place, and time.     Psychiatric:         Mood and Affect: Mood normal.         Behavior: Behavior normal.         Thought Content: Thought content normal.         Judgment: Judgment normal.         "

## 2025-07-24 LAB — TSH SERPL-ACNC: 0.94 MIU/L (ref 0.4–4.5)
